# Patient Record
Sex: MALE | Race: WHITE | NOT HISPANIC OR LATINO | Employment: FULL TIME | URBAN - METROPOLITAN AREA
[De-identification: names, ages, dates, MRNs, and addresses within clinical notes are randomized per-mention and may not be internally consistent; named-entity substitution may affect disease eponyms.]

---

## 2019-03-06 ENCOUNTER — OFFICE VISIT (OUTPATIENT)
Dept: FAMILY MEDICINE | Facility: CLINIC | Age: 39
End: 2019-03-06
Payer: COMMERCIAL

## 2019-03-06 VITALS
TEMPERATURE: 99 F | BODY MASS INDEX: 25.1 KG/M2 | HEIGHT: 67 IN | OXYGEN SATURATION: 98 % | SYSTOLIC BLOOD PRESSURE: 112 MMHG | WEIGHT: 159.94 LBS | HEART RATE: 70 BPM | DIASTOLIC BLOOD PRESSURE: 74 MMHG

## 2019-03-06 DIAGNOSIS — R68.89 FLU-LIKE SYMPTOMS: Primary | ICD-10-CM

## 2019-03-06 PROCEDURE — 99213 PR OFFICE/OUTPT VISIT, EST, LEVL III, 20-29 MIN: ICD-10-PCS | Mod: S$GLB,,, | Performed by: NURSE PRACTITIONER

## 2019-03-06 PROCEDURE — 99213 OFFICE O/P EST LOW 20 MIN: CPT | Mod: S$GLB,,, | Performed by: NURSE PRACTITIONER

## 2019-03-06 PROCEDURE — 99999 PR PBB SHADOW E&M-EST. PATIENT-LVL III: CPT | Mod: PBBFAC,,, | Performed by: NURSE PRACTITIONER

## 2019-03-06 PROCEDURE — 3008F PR BODY MASS INDEX (BMI) DOCUMENTED: ICD-10-PCS | Mod: CPTII,S$GLB,, | Performed by: NURSE PRACTITIONER

## 2019-03-06 PROCEDURE — 3008F BODY MASS INDEX DOCD: CPT | Mod: CPTII,S$GLB,, | Performed by: NURSE PRACTITIONER

## 2019-03-06 PROCEDURE — 99999 PR PBB SHADOW E&M-EST. PATIENT-LVL III: ICD-10-PCS | Mod: PBBFAC,,, | Performed by: NURSE PRACTITIONER

## 2019-03-06 RX ORDER — FLUTICASONE PROPIONATE 50 MCG
2 SPRAY, SUSPENSION (ML) NASAL DAILY
Qty: 16 G | Refills: 0 | Status: SHIPPED | OUTPATIENT
Start: 2019-03-06 | End: 2019-10-29

## 2019-03-06 RX ORDER — LEVOCETIRIZINE DIHYDROCHLORIDE 5 MG/1
5 TABLET, FILM COATED ORAL NIGHTLY
Qty: 90 TABLET | Refills: 3 | Status: SHIPPED | OUTPATIENT
Start: 2019-03-06 | End: 2019-10-29

## 2019-03-06 RX ORDER — BENZONATATE 200 MG/1
200 CAPSULE ORAL 3 TIMES DAILY PRN
Qty: 30 CAPSULE | Refills: 0 | Status: SHIPPED | OUTPATIENT
Start: 2019-03-06 | End: 2019-03-16

## 2019-03-06 RX ORDER — IBUPROFEN 200 MG
200 TABLET ORAL EVERY 6 HOURS PRN
COMMUNITY
End: 2019-10-29

## 2019-03-06 RX ORDER — PROMETHAZINE HYDROCHLORIDE AND DEXTROMETHORPHAN HYDROBROMIDE 6.25; 15 MG/5ML; MG/5ML
5 SYRUP ORAL NIGHTLY PRN
Qty: 180 ML | Refills: 0 | Status: SHIPPED | OUTPATIENT
Start: 2019-03-06 | End: 2019-03-16

## 2019-03-06 NOTE — PATIENT INSTRUCTIONS
"1. Xyzal one tab at night  2. Tessalon as needed 3x a day  3. Flonase post sinus rinse  4. Sinus rinse  5. Promethazine-DM 5ml as needed at nighttime.   HOW TO USE NEILMED SINUS RINSE TO IRRIGATE/CLEAN YOUR SINUSES      Obtain a Neilmed Sinus Rinse kit. You can get the kit from your local pharmacy or [employer] website. [employer] offers three types of kits:   The Sinus Rinse Starter Kit includes an 8-ounce (240ml) squeeze bottle and 5 packets of premixed rinse solution.   The Sinus Rinse Complete Kit includes an 8-ounce (240 ml) squeeze bottle and 50 packets of premixed rinse solution.   The Sinus Rinse Kids Starter Kit includes a 4-ounce (120ml) squeeze bottle and 30 packets of premixed rinse solution, specially formulated for children.  Wash your hands to avoid contaminating the product. The CDC recommends that you use warm water and soap. Scrub your hands for about 20 seconds, or about the amount of time it takes to sing the "Happy Birthday" song twice.  Warm up distilled or previously boiled water until it is slightly warm. You can warm water up on the stove or in the microwave in a clean safe container. You should warm the water for 5 seconds at a time if using a microwave. It should be at body-temperature, or "lukewarm."   Do not use water that is not micro-filtered, boiled, or distilled to rinse your sinuses. Tap water may contain microorganisms that could cause illness. Fill the bottle with the designated amount of water. The correct amount of water should be 8 oz. (240 ml). Your water line should be at the dotted fill line of the bottle. If you are using a Kids Sinus Rinse kit, you will use 4 oz. (120 ml) of water  Pour the contents into the bottle and tighten the cap. Make sure you screw the cap on tightly so it doesn't fall off in the next step  Place one finger over the tip and shake the bottle gently. This will allow the saline mixture to dissolve into the waterPut the nozzle tip snugly against one of your " nostrils. Keep your mouth open, because the mixture can drain from your mouth as well as the opposite nostril. This also reduces pressure on the earsSqueeze the bottle gently to force the liquid into your nasal passages. Squeeze until the solution begins to drain from the opposite nostrilSqueeze the bottle until 1/4-to-1/2 (60-to-120 ml) is used in one nostril. You can use up to half the solution per nostril, but you should always use at least one-quarter of the solution for each. Blow your nose without pinching it completely shut. Pinching your nose entirely shut would put too much pressure on your eardrums. Then, try sniffing in the remaining solution to help clear out the nasopharyngeal area (at the back of your nasal passages).   Tilt your head to the opposite side to expel any remaining solution from your sinuses or nasal passage.   Spit out any solution that reaches the back of your throat.  Repeat the last 5 steps for the other nostril  Discard the tiny amount of solution left over. Never store leftover solution. It can breed bacteria  Disinfect the sinus rinse bottle. Rinse the cap, tube, and rinse bottle with water. Then, add a drop of dishwashing detergent to the bottle and fill it with water. Put the cap on and shake the bottle well. Squeeze the soapy water through the cap. Use a bottle brush to scrub the bottle, cap, and tube. Rinse thoroughly with clean water. Air dry the bottle and nozzle on a clean towel or glass plate

## 2019-03-06 NOTE — PROGRESS NOTES
Subjective:        Chief Complaint  Chief Complaint   Patient presents with    Cough    bodyache    Fever       HPI  Star Saeed is a 38 y.o. male with multiple medical diagnoses as listed in the medical history and problem list that presents for flu like symptoms for 3 days.  Patient is new to me. 3 days of fever, chills, cough, sore throat, ear pain, post nasal drip. Denies productive cough, nausea or vomiting. Taking ibuprofen, OTC decongestant, cough syrup with some relief.         PAST MEDICAL HISTORY:  History reviewed. No pertinent past medical history.    PAST SURGICAL HISTORY:  Past Surgical History:   Procedure Laterality Date    ADENOIDECTOMY      TONSILLECTOMY         SOCIAL HISTORY:  Social History     Socioeconomic History    Marital status:      Spouse name: Not on file    Number of children: Not on file    Years of education: Not on file    Highest education level: Not on file   Social Needs    Financial resource strain: Not on file    Food insecurity - worry: Not on file    Food insecurity - inability: Not on file    Transportation needs - medical: Not on file    Transportation needs - non-medical: Not on file   Occupational History     Employer: [employer].    Tobacco Use    Smoking status: Never Smoker   Substance and Sexual Activity    Alcohol use: Yes    Drug use: Not on file    Sexual activity: Not on file   Other Topics Concern    Not on file   Social History Narrative    Not on file       FAMILY HISTORY:  Family History   Problem Relation Age of Onset    Cancer Mother         breast       ALLERGIES AND MEDICATIONS: updated and reviewed.  Review of patient's allergies indicates:  No Known Allergies  Current Outpatient Medications   Medication Sig Dispense Refill    ibuprofen (ADVIL,MOTRIN) 200 MG tablet Take 200 mg by mouth every 6 (six) hours as needed for Pain.      benzonatate (TESSALON) 200 MG capsule Take 1 capsule (200 mg total) by mouth 3 (three)  "times daily as needed for Cough. 30 capsule 0    fluticasone (FLONASE) 50 mcg/actuation nasal spray 2 sprays (100 mcg total) by Each Nare route once daily. 16 g 0    levocetirizine (XYZAL) 5 MG tablet Take 1 tablet (5 mg total) by mouth every evening. 90 tablet 3    promethazine-dextromethorphan (PROMETHAZINE-DM) 6.25-15 mg/5 mL Syrp Take 5 mLs by mouth nightly as needed. 180 mL 0     No current facility-administered medications for this visit.        ROS  Review of Systems   Constitutional: Positive for chills and fever. Negative for activity change.   HENT: Positive for congestion, ear pain (pressure), postnasal drip, rhinorrhea, sinus pressure, sinus pain, sneezing and sore throat. Negative for ear discharge.    Eyes: Negative for pain, discharge and itching.   Respiratory: Positive for cough (nonproductive). Negative for shortness of breath and wheezing.    Cardiovascular: Negative for chest pain.   Gastrointestinal: Negative for diarrhea, nausea and vomiting.   Musculoskeletal: Positive for myalgias.   Neurological: Negative for headaches.   Psychiatric/Behavioral: Negative for confusion and decreased concentration.       Objective:     Physical Exam  Vitals:    03/06/19 1136   BP: 112/74   Pulse: 70   Temp: 98.5 °F (36.9 °C)   TempSrc: Oral   SpO2: 98%   Weight: 72.6 kg (159 lb 15.1 oz)   Height: 5' 7" (1.702 m)    Body mass index is 25.05 kg/m².  Weight: 72.6 kg (159 lb 15.1 oz)   Height: 5' 7" (170.2 cm)   Physical Exam   Constitutional: He appears well-developed and well-nourished.   HENT:   Head: Normocephalic and atraumatic.   Right Ear: Ear canal normal.   Left Ear: Ear canal normal.   Nose: Mucosal edema and rhinorrhea present. No nasal deformity. No epistaxis. Right sinus exhibits maxillary sinus tenderness. Right sinus exhibits no frontal sinus tenderness. Left sinus exhibits maxillary sinus tenderness. Left sinus exhibits no frontal sinus tenderness.   Mouth/Throat: Uvula is midline and mucous " membranes are normal. Posterior oropharyngeal erythema present. No oropharyngeal exudate or posterior oropharyngeal edema.   Ample cerumen bilaterally   Eyes: Conjunctivae and EOM are normal.   Neck: Normal range of motion. Neck supple.   Cardiovascular: Normal rate.   Pulmonary/Chest: Effort normal and breath sounds normal. No stridor. He has no wheezes. He has no rales.   Musculoskeletal: He exhibits no edema.   Lymphadenopathy:     He has no cervical adenopathy.   Neurological: He is alert. No cranial nerve deficit.   Skin: Skin is warm and dry. No rash noted.   Vitals reviewed.      Assessment:     1. Flu-like symptoms      Plan:     Star was seen today for cough, bodyache and fever.    Diagnoses and all orders for this visit:    Flu-like symptoms  -     POCT Influenza A/B Molecular - NEGATIVE  -     promethazine-dextromethorphan (PROMETHAZINE-DM) 6.25-15 mg/5 mL Syrp; Take 5 mLs by mouth nightly as needed.  -     fluticasone (FLONASE) 50 mcg/actuation nasal spray; 2 sprays (100 mcg total) by Each Nare route once daily.  -     benzonatate (TESSALON) 200 MG capsule; Take 1 capsule (200 mg total) by mouth 3 (three) times daily as needed for Cough.        -     levocetirizine (XYZAL) 5 MG tablet; Take 1 tablet (5 mg total) by mouth every evening.        -     Sinus rinse as needed        -     Increase fluid intake      Health Maintenance       Date Due Completion Date    Lipid Panel 1979 ---    TETANUS VACCINE 03/31/1997 ---    Influenza Vaccine 08/01/2018 10/21/2016          Health Maintenance reviewed, addressed as per orders    Follow-up: Follow-up if symptoms worsen or fail to improve.    The patient expressed understanding and no barriers to adherence were identified.     1. The patient indicates understanding of these issues and agrees with the plan. Brief care plan is updated and reviewed with the patient as applicable.     2. The patient is given an After Visit Summary that lists all medications  with directions, allergies, orders placed during this encounter and follow-up instructions.     3. I have reviewed the patient's medical information including past medical, family, and social history sections including the medications and allergies.     4. We discussed the patient's current medications. I reconciled the patient's medication list and prepared and supplied needed refills.       Mayelin Carlos, DNP, APRN, FNP-c  Family Medicine    My collaborating physicians are Dr. Daryn Paul and Dr. Kayla Mendoza

## 2019-03-07 ENCOUNTER — PATIENT MESSAGE (OUTPATIENT)
Dept: FAMILY MEDICINE | Facility: CLINIC | Age: 39
End: 2019-03-07

## 2019-10-29 ENCOUNTER — OFFICE VISIT (OUTPATIENT)
Dept: FAMILY MEDICINE | Facility: CLINIC | Age: 39
End: 2019-10-29
Payer: COMMERCIAL

## 2019-10-29 VITALS
WEIGHT: 165.88 LBS | HEIGHT: 67 IN | HEART RATE: 78 BPM | TEMPERATURE: 98 F | BODY MASS INDEX: 26.03 KG/M2 | RESPIRATION RATE: 16 BRPM | SYSTOLIC BLOOD PRESSURE: 118 MMHG | OXYGEN SATURATION: 98 % | DIASTOLIC BLOOD PRESSURE: 72 MMHG

## 2019-10-29 DIAGNOSIS — Z23 NEED FOR INFLUENZA VACCINATION: ICD-10-CM

## 2019-10-29 DIAGNOSIS — Z00.00 ROUTINE PHYSICAL EXAMINATION: Primary | ICD-10-CM

## 2019-10-29 PROCEDURE — 90686 FLU VACCINE (QUAD) GREATER THAN OR EQUAL TO 3YO PRESERVATIVE FREE IM: ICD-10-PCS | Mod: S$GLB,,, | Performed by: NURSE PRACTITIONER

## 2019-10-29 PROCEDURE — 99999 PR PBB SHADOW E&M-EST. PATIENT-LVL IV: CPT | Mod: PBBFAC,,, | Performed by: NURSE PRACTITIONER

## 2019-10-29 PROCEDURE — 99396 PR PREVENTIVE VISIT,EST,40-64: ICD-10-PCS | Mod: 25,S$GLB,, | Performed by: NURSE PRACTITIONER

## 2019-10-29 PROCEDURE — 90686 IIV4 VACC NO PRSV 0.5 ML IM: CPT | Mod: S$GLB,,, | Performed by: NURSE PRACTITIONER

## 2019-10-29 PROCEDURE — 90471 FLU VACCINE (QUAD) GREATER THAN OR EQUAL TO 3YO PRESERVATIVE FREE IM: ICD-10-PCS | Mod: S$GLB,,, | Performed by: NURSE PRACTITIONER

## 2019-10-29 PROCEDURE — 99999 PR PBB SHADOW E&M-EST. PATIENT-LVL IV: ICD-10-PCS | Mod: PBBFAC,,, | Performed by: NURSE PRACTITIONER

## 2019-10-29 PROCEDURE — 99396 PREV VISIT EST AGE 40-64: CPT | Mod: 25,S$GLB,, | Performed by: NURSE PRACTITIONER

## 2019-10-29 PROCEDURE — 90471 IMMUNIZATION ADMIN: CPT | Mod: S$GLB,,, | Performed by: NURSE PRACTITIONER

## 2019-10-29 NOTE — PATIENT INSTRUCTIONS
Prevention Guidelines, Men Ages 40 to 49  Screening tests and vaccines are an important part of managing your health. Health counseling is essential, too. Below are guidelines for these, for men ages 40 to 49. Talk with your healthcare provider to make sure youre up to date on what you need.  Screening Who needs it How often   Alcohol misuse All men in this age group At routine exams   Blood pressure All men in this age group Every 2 years if your blood pressure reading is less than 120/80 mm Hg; yearly if your systolic blood pressure reading is 120 to 139 mm Hg, or your diastolic blood pressure reading is 80 to 89 mm Hg   Depression All men in this age group At routine exams   Type 2 diabetes or prediabetes All adults beginning at age 45 and adults without symptoms at any age who are overweight or obese and have 1 or more other risk factors for diabetes At least every 3 years (yearly if blood sugar has begun to rise)   Hepatitis C Men at increased risk for infection - talk with your healthcare provider At routine exams   High cholesterol or triglycerides All men ages 35 and older, and younger men at high risk for coronary artery disease At least every 5 years   HIV All men At routine exams   Obesity All men in this age group At routine exams   Prostate cancer Starting at age 45, talk to healthcare provider about risks and benefits of digital rectal exam (MARY) and prostate-specific antigen (PSA) screening1 At routine exams   Syphilis Men at increased risk for infection - talk with your healthcare provider At routine exams   Tuberculosis Men at increased risk for infection - talk with your healthcare provider Check with your healthcare provider   Vision All men in this age group Every 2 to 4 years if no risk factors for eye disease2   Vaccine Who needs it How often   Chickenpox (varicella) All men in this age group who have no record of this infection or vaccine 2 doses; the second dose should be given at least 4  weeks after the first dose   Hepatitis A Men at increased risk for infection - talk with your healthcare provider 2 doses given at least 6 months apart   Hepatitis B Men at increased risk for infection - talk with your healthcare provider 3 doses over 6 months; second dose should be given 1 month after the first dose; the third dose should be given at least 2 months after the second dose and at least 4 months after the first dose   Haemophilus influenzae Type B (HIB) Men at increased risk for infection - talk with your healthcare provider 1 to 3 doses   Influenza (flu) All men in this age group Once a year   Measles, mumps, rubella (MMR) All men in this age group who have no record of these infections or vaccines 1 or 2 doses   Meningococcal Men at increased risk for infection - talk with your healthcare provider 1 or more doses   Pneumococcal conjugate vaccine (PCV13) and pneumococcal polysaccharide vaccine (PPSV23) Men at increased risk for infection - talk with your healthcare provider PCV13: 1 dose ages 19 to 65 (protects against 13 types of pneumococcal bacteria)     PPSV23: 1 to 2 doses through age 64, or 1 dose at 65 or older (protects against 23 types of pneumococcal bacteria)      Tetanus/diphtheria/  pertussis (Td/Tdap) booster All men in this age group Td every 10 years, or a one-time dose of Tdap instead of a Td booster after age 18, then Td every 10 years   Counseling Who needs it How often   Diet and exercise Men who are overweight or obese When diagnosed, and then at routine exams   Sexually transmitted infection prevention Men at increased risk for infection - talk with your healthcare provider At routine exams   Use of daily aspirin Men ages 45 to 79 at risk for cardiovascular health problems At routine exams   Use of tobacco and the health effects it can cause All men in this age group Every exam   [address] [place] [address] [facility]   [facility] [employer]  Date Last Reviewed:  2/1/2017  © 5776-3513 [employer]. [address] [place] [address], [place] [address]. All rights reserved. This information is not intended as a substitute for professional medical care. Always follow your healthcare professional's instructions.

## 2019-10-29 NOTE — PROGRESS NOTES
Subjective:       Chief Complaint  Chief Complaint   Patient presents with    Annual Exam       HPI  Star Saeed is a 39 y.o. male with multiple medical diagnoses as listed in the medical history and problem list that presents for annual exam. He reports that he is feeling healthy and well today.     Diet: homemade. Breakfast- oatmeal with nuts and dried fruit. Will eat lunch and dinner.   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  Bowel/Bladder: denies    Patient Care Team:  Ander Melton MD as PCP - General (Internal Medicine)      PAST MEDICAL HISTORY:  History reviewed. No pertinent past medical history.    PAST SURGICAL HISTORY:  Past Surgical History:   Procedure Laterality Date    ADENOIDECTOMY      TONSILLECTOMY         SOCIAL HISTORY:  Social History     Socioeconomic History    Marital status:      Spouse name: Not on file    Number of children: Not on file    Years of education: Not on file    Highest education level: Not on file   Occupational History     Employer: [employer].    Social Needs    Financial resource strain: Not on file    Food insecurity:     Worry: Not on file     Inability: Not on file    Transportation needs:     Medical: Not on file     Non-medical: Not on file   Tobacco Use    Smoking status: Never Smoker   Substance and Sexual Activity    Alcohol use: Yes    Drug use: Not on file    Sexual activity: Not on file   Lifestyle    Physical activity:     Days per week: Not on file     Minutes per session: Not on file    Stress: Not on file   Relationships    Social connections:     Talks on phone: Not on file     Gets together: Not on file     Attends  "Mormonism service: Not on file     Active member of club or organization: Not on file     Attends meetings of clubs or organizations: Not on file     Relationship status: Not on file   Other Topics Concern    Not on file   Social History Narrative    Not on file       FAMILY HISTORY:  Family History   Problem Relation Age of Onset    Cancer Mother         breast       ALLERGIES AND MEDICATIONS: updated and reviewed.  Review of patient's allergies indicates:  No Known Allergies  No current outpatient medications on file.     No current facility-administered medications for this visit.          ROS  Review of Systems   Constitutional: Negative for chills, diaphoresis and fever.   HENT: Negative for congestion and rhinorrhea.    Respiratory: Negative for cough and shortness of breath.    Cardiovascular: Negative for chest pain.   Gastrointestinal: Negative for abdominal pain, constipation, diarrhea and nausea.   Genitourinary: Negative for difficulty urinating, dysuria and enuresis.   Musculoskeletal: Negative for arthralgias and joint swelling.   Skin: Negative for rash and wound.   Neurological: Negative for dizziness and headaches.   Psychiatric/Behavioral: Negative for dysphoric mood. The patient is not nervous/anxious.          Objective:       Physical Exam  Vitals:    10/29/19 1239   BP: 118/72   BP Location: Left arm   Patient Position: Sitting   BP Method: Small (Manual)   Pulse: 78   Resp: 16   Temp: 97.9 °F (36.6 °C)   TempSrc: Oral   SpO2: 98%   Weight: 75.3 kg (165 lb 14.3 oz)   Height: 5' 7" (1.702 m)    Body mass index is 25.98 kg/m².  Weight: 75.3 kg (165 lb 14.3 oz)   Height: 5' 7" (170.2 cm)   Physical Exam   Constitutional: He appears well-developed and well-nourished.   HENT:   Head: Normocephalic and atraumatic.   Eyes: Conjunctivae and EOM are normal.   Neck: Normal range of motion. Neck supple.   Cardiovascular: Normal rate.   Pulmonary/Chest: Effort normal.   Musculoskeletal: He exhibits no " edema.   Lymphadenopathy:     He has no cervical adenopathy.   Neurological: He is alert. No cranial nerve deficit.   Skin: Skin is warm and dry. No rash noted.   Vitals reviewed.    Assessment:     1. Routine physical examination    2. Need for influenza vaccination      Plan:     Star was seen today for annual exam.    Diagnoses and all orders for this visit:    Routine physical examination        -     Discussed healthy diet, regular exercise, necessary labs, age appropriate cancer screening, and routine vaccinations.          -     Declined all labs    Need for influenza vaccination  -     Influenza - Quadrivalent (3 years & older) (PF)  -     Counseled regarding seasonal influenza vaccination - administered      Health Maintenance       Date Due Completion Date    Lipid Panel 1979 ---    TETANUS VACCINE 03/31/1997 ---    Influenza Vaccine (1) 09/01/2019 10/21/2016          Health Maintenance reviewed, addressed as per orders    Follow up in about 1 year (around 10/29/2020) for Physical.    The patient expressed understanding and no barriers to adherence were identified.     1. The patient indicates understanding of these issues and agrees with the plan. Brief care plan is updated and reviewed with the patient as applicable.     2. The patient is given an After Visit Summary that lists all medications with directions, allergies, orders placed during this encounter and follow-up instructions.     3. I have reviewed the patient's medical information including past medical, family, and social history sections including the medications and allergies.     4. We discussed the patient's current medications. I reconciled the patient's medication list and prepared and supplied needed refills.       Mayelin Carlos, DNP, APRN, FNP-c  Family Medicine    My collaborating physicians are Dr. Daryn Paul and Dr. Ander Melton

## 2021-07-16 ENCOUNTER — TELEPHONE (OUTPATIENT)
Dept: DERMATOLOGY | Facility: CLINIC | Age: 41
End: 2021-07-16

## 2021-08-11 ENCOUNTER — TELEPHONE (OUTPATIENT)
Dept: FAMILY MEDICINE | Facility: CLINIC | Age: 41
End: 2021-08-11

## 2021-09-21 ENCOUNTER — OFFICE VISIT (OUTPATIENT)
Dept: FAMILY MEDICINE | Facility: CLINIC | Age: 41
End: 2021-09-21
Payer: COMMERCIAL

## 2021-09-21 VITALS
HEART RATE: 80 BPM | TEMPERATURE: 99 F | BODY MASS INDEX: 25.96 KG/M2 | DIASTOLIC BLOOD PRESSURE: 84 MMHG | WEIGHT: 165.38 LBS | OXYGEN SATURATION: 97 % | HEIGHT: 67 IN | SYSTOLIC BLOOD PRESSURE: 128 MMHG

## 2021-09-21 DIAGNOSIS — Z11.4 ENCOUNTER FOR SCREENING FOR HUMAN IMMUNODEFICIENCY VIRUS (HIV): ICD-10-CM

## 2021-09-21 DIAGNOSIS — Z86.39 HISTORY OF THYROID DISORDER: ICD-10-CM

## 2021-09-21 DIAGNOSIS — Z00.00 ROUTINE ADULT HEALTH MAINTENANCE: Primary | ICD-10-CM

## 2021-09-21 DIAGNOSIS — H43.393 FLOATERS IN VISUAL FIELD, BILATERAL: ICD-10-CM

## 2021-09-21 DIAGNOSIS — L91.8 SKIN TAG: ICD-10-CM

## 2021-09-21 DIAGNOSIS — Z97.3 WEARS GLASSES: ICD-10-CM

## 2021-09-21 DIAGNOSIS — Z13.220 SCREENING FOR LIPID DISORDERS: ICD-10-CM

## 2021-09-21 DIAGNOSIS — Z11.59 NEED FOR HEPATITIS C SCREENING TEST: ICD-10-CM

## 2021-09-21 PROCEDURE — 1160F PR REVIEW ALL MEDS BY PRESCRIBER/CLIN PHARMACIST DOCUMENTED: ICD-10-PCS | Mod: CPTII,S$GLB,, | Performed by: INTERNAL MEDICINE

## 2021-09-21 PROCEDURE — 3074F SYST BP LT 130 MM HG: CPT | Mod: CPTII,S$GLB,, | Performed by: INTERNAL MEDICINE

## 2021-09-21 PROCEDURE — 3079F DIAST BP 80-89 MM HG: CPT | Mod: CPTII,S$GLB,, | Performed by: INTERNAL MEDICINE

## 2021-09-21 PROCEDURE — 3008F BODY MASS INDEX DOCD: CPT | Mod: CPTII,S$GLB,, | Performed by: INTERNAL MEDICINE

## 2021-09-21 PROCEDURE — 3079F PR MOST RECENT DIASTOLIC BLOOD PRESSURE 80-89 MM HG: ICD-10-PCS | Mod: CPTII,S$GLB,, | Performed by: INTERNAL MEDICINE

## 2021-09-21 PROCEDURE — 3008F PR BODY MASS INDEX (BMI) DOCUMENTED: ICD-10-PCS | Mod: CPTII,S$GLB,, | Performed by: INTERNAL MEDICINE

## 2021-09-21 PROCEDURE — 1159F MED LIST DOCD IN RCRD: CPT | Mod: CPTII,S$GLB,, | Performed by: INTERNAL MEDICINE

## 2021-09-21 PROCEDURE — 1160F RVW MEDS BY RX/DR IN RCRD: CPT | Mod: CPTII,S$GLB,, | Performed by: INTERNAL MEDICINE

## 2021-09-21 PROCEDURE — 3074F PR MOST RECENT SYSTOLIC BLOOD PRESSURE < 130 MM HG: ICD-10-PCS | Mod: CPTII,S$GLB,, | Performed by: INTERNAL MEDICINE

## 2021-09-21 PROCEDURE — 99396 PREV VISIT EST AGE 40-64: CPT | Mod: S$GLB,,, | Performed by: INTERNAL MEDICINE

## 2021-09-21 PROCEDURE — 99396 PR PREVENTIVE VISIT,EST,40-64: ICD-10-PCS | Mod: S$GLB,,, | Performed by: INTERNAL MEDICINE

## 2021-09-21 PROCEDURE — 99999 PR PBB SHADOW E&M-EST. PATIENT-LVL IV: ICD-10-PCS | Mod: PBBFAC,,, | Performed by: INTERNAL MEDICINE

## 2021-09-21 PROCEDURE — 99999 PR PBB SHADOW E&M-EST. PATIENT-LVL IV: CPT | Mod: PBBFAC,,, | Performed by: INTERNAL MEDICINE

## 2021-09-21 PROCEDURE — 1159F PR MEDICATION LIST DOCUMENTED IN MEDICAL RECORD: ICD-10-PCS | Mod: CPTII,S$GLB,, | Performed by: INTERNAL MEDICINE

## 2021-09-25 ENCOUNTER — LAB VISIT (OUTPATIENT)
Dept: LAB | Facility: HOSPITAL | Age: 41
End: 2021-09-25
Attending: INTERNAL MEDICINE
Payer: COMMERCIAL

## 2021-09-25 DIAGNOSIS — Z11.59 NEED FOR HEPATITIS C SCREENING TEST: ICD-10-CM

## 2021-09-25 DIAGNOSIS — Z13.220 SCREENING FOR LIPID DISORDERS: ICD-10-CM

## 2021-09-25 DIAGNOSIS — Z00.00 ROUTINE ADULT HEALTH MAINTENANCE: ICD-10-CM

## 2021-09-25 DIAGNOSIS — Z11.4 ENCOUNTER FOR SCREENING FOR HUMAN IMMUNODEFICIENCY VIRUS (HIV): ICD-10-CM

## 2021-09-25 DIAGNOSIS — Z86.39 HISTORY OF THYROID DISORDER: ICD-10-CM

## 2021-09-25 LAB
ALBUMIN SERPL BCP-MCNC: 4.3 G/DL (ref 3.5–5.2)
ALP SERPL-CCNC: 41 U/L (ref 55–135)
ALT SERPL W/O P-5'-P-CCNC: 20 U/L (ref 10–44)
ANION GAP SERPL CALC-SCNC: 10 MMOL/L (ref 8–16)
AST SERPL-CCNC: 15 U/L (ref 10–40)
BILIRUB SERPL-MCNC: 0.8 MG/DL (ref 0.1–1)
BUN SERPL-MCNC: 10 MG/DL (ref 6–20)
CALCIUM SERPL-MCNC: 9.5 MG/DL (ref 8.7–10.5)
CHLORIDE SERPL-SCNC: 105 MMOL/L (ref 95–110)
CHOLEST SERPL-MCNC: 187 MG/DL (ref 120–199)
CHOLEST/HDLC SERPL: 3.2 {RATIO} (ref 2–5)
CO2 SERPL-SCNC: 23 MMOL/L (ref 23–29)
CREAT SERPL-MCNC: 0.9 MG/DL (ref 0.5–1.4)
EST. GFR  (AFRICAN AMERICAN): >60 ML/MIN/1.73 M^2
EST. GFR  (NON AFRICAN AMERICAN): >60 ML/MIN/1.73 M^2
GLUCOSE SERPL-MCNC: 91 MG/DL (ref 70–110)
HDLC SERPL-MCNC: 59 MG/DL (ref 40–75)
HDLC SERPL: 31.6 % (ref 20–50)
LDLC SERPL CALC-MCNC: 113.2 MG/DL (ref 63–159)
NONHDLC SERPL-MCNC: 128 MG/DL
POTASSIUM SERPL-SCNC: 4.7 MMOL/L (ref 3.5–5.1)
PROT SERPL-MCNC: 7.4 G/DL (ref 6–8.4)
SODIUM SERPL-SCNC: 138 MMOL/L (ref 136–145)
T4 FREE SERPL-MCNC: 0.98 NG/DL (ref 0.71–1.51)
TRIGL SERPL-MCNC: 74 MG/DL (ref 30–150)
TSH SERPL DL<=0.005 MIU/L-ACNC: 5.49 UIU/ML (ref 0.4–4)

## 2021-09-25 PROCEDURE — 84443 ASSAY THYROID STIM HORMONE: CPT | Performed by: INTERNAL MEDICINE

## 2021-09-25 PROCEDURE — 80061 LIPID PANEL: CPT | Performed by: INTERNAL MEDICINE

## 2021-09-25 PROCEDURE — 84439 ASSAY OF FREE THYROXINE: CPT | Performed by: INTERNAL MEDICINE

## 2021-09-25 PROCEDURE — 86803 HEPATITIS C AB TEST: CPT | Performed by: INTERNAL MEDICINE

## 2021-09-25 PROCEDURE — 36415 COLL VENOUS BLD VENIPUNCTURE: CPT | Mod: PO | Performed by: INTERNAL MEDICINE

## 2021-09-25 PROCEDURE — 80053 COMPREHEN METABOLIC PANEL: CPT | Performed by: INTERNAL MEDICINE

## 2021-09-25 PROCEDURE — 87389 HIV-1 AG W/HIV-1&-2 AB AG IA: CPT | Performed by: INTERNAL MEDICINE

## 2021-09-27 LAB
HCV AB SERPL QL IA: NEGATIVE
HIV 1+2 AB+HIV1 P24 AG SERPL QL IA: NEGATIVE

## 2021-10-05 ENCOUNTER — TELEPHONE (OUTPATIENT)
Dept: FAMILY MEDICINE | Facility: CLINIC | Age: 41
End: 2021-10-05

## 2021-10-05 PROBLEM — R79.89 ABNORMAL TSH: Status: ACTIVE | Noted: 2021-10-05

## 2021-10-07 ENCOUNTER — OFFICE VISIT (OUTPATIENT)
Dept: URGENT CARE | Facility: CLINIC | Age: 41
End: 2021-10-07
Payer: COMMERCIAL

## 2021-10-07 VITALS
OXYGEN SATURATION: 99 % | SYSTOLIC BLOOD PRESSURE: 128 MMHG | TEMPERATURE: 98 F | HEART RATE: 68 BPM | RESPIRATION RATE: 14 BRPM | DIASTOLIC BLOOD PRESSURE: 78 MMHG

## 2021-10-07 DIAGNOSIS — S43.51XA SPRAIN OF ACROMIOCLAVICULAR JOINT, RIGHT, INITIAL ENCOUNTER: Primary | ICD-10-CM

## 2021-10-07 PROCEDURE — 1160F RVW MEDS BY RX/DR IN RCRD: CPT | Mod: CPTII,S$GLB,, | Performed by: PHYSICIAN ASSISTANT

## 2021-10-07 PROCEDURE — 99203 PR OFFICE/OUTPT VISIT, NEW, LEVL III, 30-44 MIN: ICD-10-PCS | Mod: S$GLB,,, | Performed by: PHYSICIAN ASSISTANT

## 2021-10-07 PROCEDURE — 3074F SYST BP LT 130 MM HG: CPT | Mod: CPTII,S$GLB,, | Performed by: PHYSICIAN ASSISTANT

## 2021-10-07 PROCEDURE — 73030 XR SHOULDER COMPLETE 2 OR MORE VIEWS RIGHT: ICD-10-PCS | Mod: RT,S$GLB,, | Performed by: RADIOLOGY

## 2021-10-07 PROCEDURE — 3078F PR MOST RECENT DIASTOLIC BLOOD PRESSURE < 80 MM HG: ICD-10-PCS | Mod: CPTII,S$GLB,, | Performed by: PHYSICIAN ASSISTANT

## 2021-10-07 PROCEDURE — 99203 OFFICE O/P NEW LOW 30 MIN: CPT | Mod: S$GLB,,, | Performed by: PHYSICIAN ASSISTANT

## 2021-10-07 PROCEDURE — 1159F MED LIST DOCD IN RCRD: CPT | Mod: CPTII,S$GLB,, | Performed by: PHYSICIAN ASSISTANT

## 2021-10-07 PROCEDURE — 73030 X-RAY EXAM OF SHOULDER: CPT | Mod: RT,S$GLB,, | Performed by: RADIOLOGY

## 2021-10-07 PROCEDURE — 1159F PR MEDICATION LIST DOCUMENTED IN MEDICAL RECORD: ICD-10-PCS | Mod: CPTII,S$GLB,, | Performed by: PHYSICIAN ASSISTANT

## 2021-10-07 PROCEDURE — 3074F PR MOST RECENT SYSTOLIC BLOOD PRESSURE < 130 MM HG: ICD-10-PCS | Mod: CPTII,S$GLB,, | Performed by: PHYSICIAN ASSISTANT

## 2021-10-07 PROCEDURE — 1160F PR REVIEW ALL MEDS BY PRESCRIBER/CLIN PHARMACIST DOCUMENTED: ICD-10-PCS | Mod: CPTII,S$GLB,, | Performed by: PHYSICIAN ASSISTANT

## 2021-10-07 PROCEDURE — 3078F DIAST BP <80 MM HG: CPT | Mod: CPTII,S$GLB,, | Performed by: PHYSICIAN ASSISTANT

## 2021-10-07 RX ORDER — METHOCARBAMOL 500 MG/1
500 TABLET, FILM COATED ORAL 3 TIMES DAILY
Qty: 10 TABLET | Refills: 0 | Status: SHIPPED | OUTPATIENT
Start: 2021-10-07 | End: 2021-10-10

## 2021-12-09 ENCOUNTER — OFFICE VISIT (OUTPATIENT)
Dept: DERMATOLOGY | Facility: CLINIC | Age: 41
End: 2021-12-09
Payer: COMMERCIAL

## 2021-12-09 DIAGNOSIS — D48.5 NEOPLASM OF UNCERTAIN BEHAVIOR OF SKIN: ICD-10-CM

## 2021-12-09 PROCEDURE — 99499 UNLISTED E&M SERVICE: CPT | Mod: S$GLB,,, | Performed by: DERMATOLOGY

## 2021-12-09 PROCEDURE — 88305 TISSUE EXAM BY PATHOLOGIST: CPT | Mod: 26,,, | Performed by: PATHOLOGY

## 2021-12-09 PROCEDURE — 99499 NO LOS: ICD-10-PCS | Mod: S$GLB,,, | Performed by: DERMATOLOGY

## 2021-12-09 PROCEDURE — 11102 PR TANGENTIAL BIOPSY, SKIN, SINGLE LESION: ICD-10-PCS | Mod: S$GLB,,, | Performed by: DERMATOLOGY

## 2021-12-09 PROCEDURE — 11102 TANGNTL BX SKIN SINGLE LES: CPT | Mod: S$GLB,,, | Performed by: DERMATOLOGY

## 2021-12-09 PROCEDURE — 88304 TISSUE EXAM BY PATHOLOGIST: CPT | Performed by: PATHOLOGY

## 2021-12-09 PROCEDURE — 88305 TISSUE EXAM BY PATHOLOGIST: ICD-10-PCS | Mod: 26,,, | Performed by: PATHOLOGY

## 2021-12-16 LAB
FINAL PATHOLOGIC DIAGNOSIS: NORMAL
GROSS: NORMAL
Lab: NORMAL
MICROSCOPIC EXAM: NORMAL

## 2022-09-02 ENCOUNTER — OFFICE VISIT (OUTPATIENT)
Dept: FAMILY MEDICINE | Facility: CLINIC | Age: 42
End: 2022-09-02
Payer: COMMERCIAL

## 2022-09-02 ENCOUNTER — LAB VISIT (OUTPATIENT)
Dept: LAB | Facility: HOSPITAL | Age: 42
End: 2022-09-02
Attending: INTERNAL MEDICINE
Payer: COMMERCIAL

## 2022-09-02 VITALS
SYSTOLIC BLOOD PRESSURE: 110 MMHG | WEIGHT: 153.69 LBS | TEMPERATURE: 98 F | OXYGEN SATURATION: 97 % | HEIGHT: 67 IN | DIASTOLIC BLOOD PRESSURE: 82 MMHG | BODY MASS INDEX: 24.12 KG/M2 | HEART RATE: 65 BPM

## 2022-09-02 DIAGNOSIS — Z00.00 ROUTINE ADULT HEALTH MAINTENANCE: ICD-10-CM

## 2022-09-02 DIAGNOSIS — Z00.00 ROUTINE ADULT HEALTH MAINTENANCE: Primary | ICD-10-CM

## 2022-09-02 DIAGNOSIS — Z97.3 WEARS GLASSES: ICD-10-CM

## 2022-09-02 DIAGNOSIS — Z86.39 HISTORY OF THYROID DISORDER: ICD-10-CM

## 2022-09-02 LAB
ALBUMIN SERPL BCP-MCNC: 4.4 G/DL (ref 3.5–5.2)
ALP SERPL-CCNC: 38 U/L (ref 55–135)
ALT SERPL W/O P-5'-P-CCNC: 17 U/L (ref 10–44)
ANION GAP SERPL CALC-SCNC: 8 MMOL/L (ref 8–16)
AST SERPL-CCNC: 17 U/L (ref 10–40)
BASOPHILS # BLD AUTO: 0.03 K/UL (ref 0–0.2)
BASOPHILS NFR BLD: 0.5 % (ref 0–1.9)
BILIRUB SERPL-MCNC: 1 MG/DL (ref 0.1–1)
BUN SERPL-MCNC: 13 MG/DL (ref 6–20)
CALCIUM SERPL-MCNC: 9.9 MG/DL (ref 8.7–10.5)
CHLORIDE SERPL-SCNC: 107 MMOL/L (ref 95–110)
CHOLEST SERPL-MCNC: 178 MG/DL (ref 120–199)
CHOLEST/HDLC SERPL: 2.8 {RATIO} (ref 2–5)
CO2 SERPL-SCNC: 24 MMOL/L (ref 23–29)
CREAT SERPL-MCNC: 0.8 MG/DL (ref 0.5–1.4)
DIFFERENTIAL METHOD: ABNORMAL
EOSINOPHIL # BLD AUTO: 0.4 K/UL (ref 0–0.5)
EOSINOPHIL NFR BLD: 6.2 % (ref 0–8)
ERYTHROCYTE [DISTWIDTH] IN BLOOD BY AUTOMATED COUNT: 13.2 % (ref 11.5–14.5)
EST. GFR  (NO RACE VARIABLE): >60 ML/MIN/1.73 M^2
GLUCOSE SERPL-MCNC: 78 MG/DL (ref 70–110)
HCT VFR BLD AUTO: 41.9 % (ref 40–54)
HDLC SERPL-MCNC: 63 MG/DL (ref 40–75)
HDLC SERPL: 35.4 % (ref 20–50)
HGB BLD-MCNC: 13.5 G/DL (ref 14–18)
IMM GRANULOCYTES # BLD AUTO: 0.02 K/UL (ref 0–0.04)
IMM GRANULOCYTES NFR BLD AUTO: 0.3 % (ref 0–0.5)
LDLC SERPL CALC-MCNC: 96.2 MG/DL (ref 63–159)
LYMPHOCYTES # BLD AUTO: 2.5 K/UL (ref 1–4.8)
LYMPHOCYTES NFR BLD: 37 % (ref 18–48)
MCH RBC QN AUTO: 29.8 PG (ref 27–31)
MCHC RBC AUTO-ENTMCNC: 32.2 G/DL (ref 32–36)
MCV RBC AUTO: 93 FL (ref 82–98)
MONOCYTES # BLD AUTO: 0.6 K/UL (ref 0.3–1)
MONOCYTES NFR BLD: 9.1 % (ref 4–15)
NEUTROPHILS # BLD AUTO: 3.1 K/UL (ref 1.8–7.7)
NEUTROPHILS NFR BLD: 46.9 % (ref 38–73)
NONHDLC SERPL-MCNC: 115 MG/DL
NRBC BLD-RTO: 0 /100 WBC
PLATELET # BLD AUTO: 198 K/UL (ref 150–450)
PMV BLD AUTO: 12 FL (ref 9.2–12.9)
POTASSIUM SERPL-SCNC: 4.8 MMOL/L (ref 3.5–5.1)
PROT SERPL-MCNC: 7.2 G/DL (ref 6–8.4)
RBC # BLD AUTO: 4.53 M/UL (ref 4.6–6.2)
SODIUM SERPL-SCNC: 139 MMOL/L (ref 136–145)
TRIGL SERPL-MCNC: 94 MG/DL (ref 30–150)
TSH SERPL DL<=0.005 MIU/L-ACNC: 3.9 UIU/ML (ref 0.4–4)
WBC # BLD AUTO: 6.62 K/UL (ref 3.9–12.7)

## 2022-09-02 PROCEDURE — 99999 PR PBB SHADOW E&M-EST. PATIENT-LVL III: ICD-10-PCS | Mod: PBBFAC,,, | Performed by: INTERNAL MEDICINE

## 2022-09-02 PROCEDURE — 99396 PREV VISIT EST AGE 40-64: CPT | Mod: S$GLB,,, | Performed by: INTERNAL MEDICINE

## 2022-09-02 PROCEDURE — 80053 COMPREHEN METABOLIC PANEL: CPT | Performed by: INTERNAL MEDICINE

## 2022-09-02 PROCEDURE — 85025 COMPLETE CBC W/AUTO DIFF WBC: CPT | Performed by: INTERNAL MEDICINE

## 2022-09-02 PROCEDURE — 1159F PR MEDICATION LIST DOCUMENTED IN MEDICAL RECORD: ICD-10-PCS | Mod: CPTII,S$GLB,, | Performed by: INTERNAL MEDICINE

## 2022-09-02 PROCEDURE — 80061 LIPID PANEL: CPT | Performed by: INTERNAL MEDICINE

## 2022-09-02 PROCEDURE — 3074F PR MOST RECENT SYSTOLIC BLOOD PRESSURE < 130 MM HG: ICD-10-PCS | Mod: CPTII,S$GLB,, | Performed by: INTERNAL MEDICINE

## 2022-09-02 PROCEDURE — 3074F SYST BP LT 130 MM HG: CPT | Mod: CPTII,S$GLB,, | Performed by: INTERNAL MEDICINE

## 2022-09-02 PROCEDURE — 99396 PR PREVENTIVE VISIT,EST,40-64: ICD-10-PCS | Mod: S$GLB,,, | Performed by: INTERNAL MEDICINE

## 2022-09-02 PROCEDURE — 99999 PR PBB SHADOW E&M-EST. PATIENT-LVL III: CPT | Mod: PBBFAC,,, | Performed by: INTERNAL MEDICINE

## 2022-09-02 PROCEDURE — 3079F PR MOST RECENT DIASTOLIC BLOOD PRESSURE 80-89 MM HG: ICD-10-PCS | Mod: CPTII,S$GLB,, | Performed by: INTERNAL MEDICINE

## 2022-09-02 PROCEDURE — 3008F PR BODY MASS INDEX (BMI) DOCUMENTED: ICD-10-PCS | Mod: CPTII,S$GLB,, | Performed by: INTERNAL MEDICINE

## 2022-09-02 PROCEDURE — 3079F DIAST BP 80-89 MM HG: CPT | Mod: CPTII,S$GLB,, | Performed by: INTERNAL MEDICINE

## 2022-09-02 PROCEDURE — 84443 ASSAY THYROID STIM HORMONE: CPT | Performed by: INTERNAL MEDICINE

## 2022-09-02 PROCEDURE — 36415 COLL VENOUS BLD VENIPUNCTURE: CPT | Mod: PO | Performed by: INTERNAL MEDICINE

## 2022-09-02 PROCEDURE — 1159F MED LIST DOCD IN RCRD: CPT | Mod: CPTII,S$GLB,, | Performed by: INTERNAL MEDICINE

## 2022-09-02 PROCEDURE — 3008F BODY MASS INDEX DOCD: CPT | Mod: CPTII,S$GLB,, | Performed by: INTERNAL MEDICINE

## 2022-09-02 NOTE — PROGRESS NOTES
Subjective:     HPI  Star Saeed is a 42 y.o. male with medical diagnoses as listed in the medical history and problem list that presents for above complaint(s).    Went fully vegetarian without alcohol for 3 months - now drinking some beer - he does drink beer currently.     Some family history of HTN, heart disease (early CAD in his grandparents)    Patient Care Team:  Ander Melton MD as PCP - General (Internal Medicine)      PAST MEDICAL HISTORY:  Past Medical History:   Diagnosis Date    Thyroid disorder        PAST SURGICAL HISTORY:  Past Surgical History:   Procedure Laterality Date    ADENOIDECTOMY      TONSILLECTOMY         SOCIAL HISTORY:  Social History     Socioeconomic History    Marital status:    Occupational History     Employer: [employer].    Tobacco Use    Smoking status: Never    Smokeless tobacco: Never   Substance and Sexual Activity    Alcohol use: Yes   Social History Narrative    Rides 75 miles per week (bikes) to work        FAMILY HISTORY:  Family History   Problem Relation Age of Onset    Cancer Mother         breast    Hypertension Father     Anxiety disorder Father     Breast cancer Maternal Grandmother     Coronary artery disease Maternal Grandfather     Hypertension Maternal Grandfather     Coronary artery disease Paternal Grandfather     ADD / ADHD Brother     Depression Brother        ALLERGIES AND MEDICATIONS: updated and reviewed.  Review of patient's allergies indicates:  No Known Allergies  No current outpatient medications on file.     No current facility-administered medications for this visit.     Review of Systems   HENT:  Negative for hearing loss.    Eyes:  Negative for discharge.   Respiratory:  Negative for wheezing.    Cardiovascular:  Negative for chest pain and palpitations.   Gastrointestinal:  Negative for blood in stool, constipation, diarrhea and vomiting.   Genitourinary:  Negative for hematuria and urgency.   Musculoskeletal:  Negative for  "neck pain.   Neurological:  Negative for weakness and headaches.   Endo/Heme/Allergies:  Negative for polydipsia.     Objective:       Physical Exam  Vitals:    09/02/22 0922   BP: 110/82   BP Location: Right arm   Patient Position: Sitting   BP Method: Medium (Manual)   Pulse: 65   Temp: 98.3 °F (36.8 °C)   TempSrc: Oral   SpO2: 97%   Weight: 69.7 kg (153 lb 10.6 oz)   Height: 5' 7" (1.702 m)    Body mass index is 24.07 kg/m².  Weight: 69.7 kg (153 lb 10.6 oz)   Height: 5' 7" (170.2 cm)   Physical Exam  Vitals reviewed.   Constitutional:       General: He is not in acute distress.     Appearance: He is well-developed.   HENT:      Head: Normocephalic and atraumatic.      Right Ear: External ear normal.      Left Ear: External ear normal.      Nose: Nose normal.   Eyes:      Pupils: Pupils are equal, round, and reactive to light.   Neck:      Thyroid: No thyromegaly.   Cardiovascular:      Rate and Rhythm: Normal rate.      Heart sounds: Normal heart sounds. No murmur heard.  Pulmonary:      Effort: Pulmonary effort is normal. No respiratory distress.      Breath sounds: Normal breath sounds. No stridor.   Abdominal:      General: Bowel sounds are normal. There is no distension.      Palpations: Abdomen is soft. There is no mass.      Tenderness: There is no abdominal tenderness. There is no guarding.      Hernia: No hernia is present.   Musculoskeletal:         General: No tenderness. Normal range of motion.      Cervical back: Normal range of motion and neck supple.   Skin:     General: Skin is warm and dry.      Findings: No erythema or rash.   Neurological:      Mental Status: He is alert.      Cranial Nerves: No cranial nerve deficit.   Psychiatric:         Behavior: Behavior normal.           Assessment:     1. Routine adult health maintenance    2. Wears glasses    3. History of thyroid disorder      Plan:     Star was seen today for annual exam.    Diagnoses and all orders for this visit:    Routine adult " health maintenance  Wears glasses  History of thyroid disorder  Labs as noted - doing well. Discussed healthy diet, regular exercise, necessary labs, age appropriate cancer screening, and routine vaccinations.    -     TSH; Future  -     Lipid Panel; Future  -     Comprehensive Metabolic Panel; Future  -     CBC Auto Differential; Future      Health Maintenance reviewed, addressed as per orders    F/u in 1 year      1. The patient indicates understanding of these issues and agrees with the plan. Brief care plan is updated and reviewed with the patient as applicable.   2. The patient is given an After Visit Summary that lists all medications with directions, allergies, orders placed during this encounter and follow-up instructions.   3. I have reviewed the patient's medical information including past medical, family, and social history sections including the medications and allergies.   4. We discussed the patient's current medications. I reconciled the patient's medication list and prepared and supplied needed refills.       Ander Melton MD  Internal Medicine-Pediatrics

## 2023-09-01 ENCOUNTER — OFFICE VISIT (OUTPATIENT)
Dept: FAMILY MEDICINE | Facility: CLINIC | Age: 43
End: 2023-09-01
Payer: COMMERCIAL

## 2023-09-01 VITALS
TEMPERATURE: 98 F | DIASTOLIC BLOOD PRESSURE: 80 MMHG | SYSTOLIC BLOOD PRESSURE: 112 MMHG | OXYGEN SATURATION: 96 % | HEART RATE: 66 BPM | WEIGHT: 171.94 LBS | BODY MASS INDEX: 26.99 KG/M2 | HEIGHT: 67 IN

## 2023-09-01 DIAGNOSIS — Z86.39 HISTORY OF THYROID DISORDER: ICD-10-CM

## 2023-09-01 DIAGNOSIS — Z00.00 ROUTINE ADULT HEALTH MAINTENANCE: Primary | ICD-10-CM

## 2023-09-01 DIAGNOSIS — Z97.3 WEARS GLASSES: ICD-10-CM

## 2023-09-01 PROCEDURE — 3079F DIAST BP 80-89 MM HG: CPT | Mod: CPTII,S$GLB,, | Performed by: INTERNAL MEDICINE

## 2023-09-01 PROCEDURE — 99396 PR PREVENTIVE VISIT,EST,40-64: ICD-10-PCS | Mod: S$GLB,,, | Performed by: INTERNAL MEDICINE

## 2023-09-01 PROCEDURE — 3074F SYST BP LT 130 MM HG: CPT | Mod: CPTII,S$GLB,, | Performed by: INTERNAL MEDICINE

## 2023-09-01 PROCEDURE — 1159F PR MEDICATION LIST DOCUMENTED IN MEDICAL RECORD: ICD-10-PCS | Mod: CPTII,S$GLB,, | Performed by: INTERNAL MEDICINE

## 2023-09-01 PROCEDURE — 3008F BODY MASS INDEX DOCD: CPT | Mod: CPTII,S$GLB,, | Performed by: INTERNAL MEDICINE

## 2023-09-01 PROCEDURE — 99999 PR PBB SHADOW E&M-EST. PATIENT-LVL IV: ICD-10-PCS | Mod: PBBFAC,,, | Performed by: INTERNAL MEDICINE

## 2023-09-01 PROCEDURE — 99999 PR PBB SHADOW E&M-EST. PATIENT-LVL IV: CPT | Mod: PBBFAC,,, | Performed by: INTERNAL MEDICINE

## 2023-09-01 PROCEDURE — 3074F PR MOST RECENT SYSTOLIC BLOOD PRESSURE < 130 MM HG: ICD-10-PCS | Mod: CPTII,S$GLB,, | Performed by: INTERNAL MEDICINE

## 2023-09-01 PROCEDURE — 3008F PR BODY MASS INDEX (BMI) DOCUMENTED: ICD-10-PCS | Mod: CPTII,S$GLB,, | Performed by: INTERNAL MEDICINE

## 2023-09-01 PROCEDURE — 1159F MED LIST DOCD IN RCRD: CPT | Mod: CPTII,S$GLB,, | Performed by: INTERNAL MEDICINE

## 2023-09-01 PROCEDURE — 99396 PREV VISIT EST AGE 40-64: CPT | Mod: S$GLB,,, | Performed by: INTERNAL MEDICINE

## 2023-09-01 PROCEDURE — 3079F PR MOST RECENT DIASTOLIC BLOOD PRESSURE 80-89 MM HG: ICD-10-PCS | Mod: CPTII,S$GLB,, | Performed by: INTERNAL MEDICINE

## 2023-09-01 NOTE — PROGRESS NOTES
Subjective:     HPI  Star Saeed is a 43 y.o. male with medical diagnoses as listed in the medical history and problem list that presents for above complaint(s).    Some diminishment in physical activity this summer - Will restart biking     No major changes since last year notably    family history of HTN, heart disease (early CAD in his grandparents)    Patient Care Team:  Ander Melton MD as PCP - General (Internal Medicine)      PAST MEDICAL HISTORY:  Past Medical History:   Diagnosis Date    Thyroid disorder        PAST SURGICAL HISTORY:  Past Surgical History:   Procedure Laterality Date    ADENOIDECTOMY      TONSILLECTOMY         SOCIAL HISTORY:  Social History     Socioeconomic History    Marital status:    Occupational History     Employer: [employer].    Tobacco Use    Smoking status: Never    Smokeless tobacco: Never   Substance and Sexual Activity    Alcohol use: Yes     Comment: 1-2x/day    Drug use: Never    Sexual activity: Yes     Partners: Female     Birth control/protection: I.U.D.   Social History Narrative    Rides 75 miles per week (bikes) to work        FAMILY HISTORY:  Family History   Problem Relation Age of Onset    Cancer Mother         breast    Hypertension Father     Anxiety disorder Father     ADD / ADHD Brother     Depression Brother     Cancer Maternal Grandmother     Breast cancer Maternal Grandmother     Coronary artery disease Maternal Grandfather     Hypertension Maternal Grandfather     Coronary artery disease Paternal Grandfather        ALLERGIES AND MEDICATIONS: updated and reviewed.  Review of patient's allergies indicates:  No Known Allergies  No current outpatient medications on file.     No current facility-administered medications for this visit.     Review of Systems   HENT:  Negative for hearing loss.    Eyes:  Negative for discharge.   Respiratory:  Negative for wheezing.    Cardiovascular:  Negative for chest pain and palpitations.  "  Gastrointestinal:  Negative for blood in stool, constipation, diarrhea and vomiting.   Genitourinary:  Negative for hematuria and urgency.   Musculoskeletal:  Negative for neck pain.   Neurological:  Negative for weakness and headaches.   Endo/Heme/Allergies:  Negative for polydipsia.       Objective:       Physical Exam  Vitals:    09/01/23 0720   BP: 112/80   Pulse: 66   Temp: 98.2 °F (36.8 °C)   TempSrc: Oral   SpO2: 96%   Weight: 78 kg (171 lb 15.3 oz)   Height: 5' 7" (1.702 m)    Body mass index is 26.93 kg/m².  Weight: 78 kg (171 lb 15.3 oz)   Height: 5' 7" (170.2 cm)   Physical Exam  Vitals reviewed.   Constitutional:       General: He is not in acute distress.     Appearance: He is well-developed.   HENT:      Head: Normocephalic and atraumatic.      Right Ear: External ear normal.      Left Ear: External ear normal.      Nose: Nose normal.      Mouth/Throat:      Mouth: Mucous membranes are moist.      Pharynx: No posterior oropharyngeal erythema.   Eyes:      Pupils: Pupils are equal, round, and reactive to light.   Neck:      Thyroid: No thyromegaly.   Cardiovascular:      Rate and Rhythm: Normal rate.      Heart sounds: Normal heart sounds. No murmur heard.  Pulmonary:      Effort: Pulmonary effort is normal. No respiratory distress.      Breath sounds: Normal breath sounds. No stridor.   Abdominal:      General: Bowel sounds are normal. There is no distension.      Palpations: Abdomen is soft. There is no mass.      Tenderness: There is no abdominal tenderness. There is no guarding.      Hernia: No hernia is present.   Musculoskeletal:         General: No tenderness. Normal range of motion.      Cervical back: Normal range of motion and neck supple.   Lymphadenopathy:      Cervical: No cervical adenopathy.   Skin:     General: Skin is warm and dry.      Findings: No erythema or rash.   Neurological:      Mental Status: He is alert.      Cranial Nerves: No cranial nerve deficit.   Psychiatric:         " Behavior: Behavior normal.             Assessment:     1. Routine adult health maintenance    2. History of thyroid disorder        Plan:     Star was seen today for annual exam.    Diagnoses and all orders for this visit:    Routine adult health maintenance  Wears glasses  History of thyroid disorder  Labs in 2022 excellent. No need for updated labs at this time. Discussed healthy diet, regular exercise,  age appropriate cancer screening, and routine vaccinations.    -     TSH; Future  -     Lipid Panel; Future  -     Comprehensive Metabolic Panel; Future  -     CBC Auto Differential; Future    Health Maintenance reviewed, addressed as per orders    F/u in 1 year      1. The patient indicates understanding of these issues and agrees with the plan. Brief care plan is updated and reviewed with the patient as applicable.   2. The patient is given an After Visit Summary that lists all medications with directions, allergies, orders placed during this encounter and follow-up instructions.   3. I have reviewed the patient's medical information including past medical, family, and social history sections including the medications and allergies.   4. We discussed the patient's current medications. I reconciled the patient's medication list and prepared and supplied needed refills.       Ander Melton MD  Internal Medicine-Pediatrics

## 2024-05-06 ENCOUNTER — PATIENT MESSAGE (OUTPATIENT)
Dept: ADMINISTRATIVE | Facility: HOSPITAL | Age: 44
End: 2024-05-06
Payer: COMMERCIAL

## 2024-08-13 ENCOUNTER — LAB VISIT (OUTPATIENT)
Dept: LAB | Facility: HOSPITAL | Age: 44
End: 2024-08-13
Attending: INTERNAL MEDICINE
Payer: COMMERCIAL

## 2024-08-13 ENCOUNTER — OFFICE VISIT (OUTPATIENT)
Dept: FAMILY MEDICINE | Facility: CLINIC | Age: 44
End: 2024-08-13
Payer: COMMERCIAL

## 2024-08-13 VITALS
HEIGHT: 67 IN | TEMPERATURE: 99 F | HEART RATE: 64 BPM | DIASTOLIC BLOOD PRESSURE: 82 MMHG | OXYGEN SATURATION: 98 % | SYSTOLIC BLOOD PRESSURE: 130 MMHG | WEIGHT: 166.13 LBS | BODY MASS INDEX: 26.08 KG/M2

## 2024-08-13 DIAGNOSIS — Z12.11 COLON CANCER SCREENING: ICD-10-CM

## 2024-08-13 DIAGNOSIS — Z00.00 ROUTINE ADULT HEALTH MAINTENANCE: Primary | ICD-10-CM

## 2024-08-13 DIAGNOSIS — Z86.39 HISTORY OF THYROID DISORDER: ICD-10-CM

## 2024-08-13 DIAGNOSIS — Z00.00 ROUTINE ADULT HEALTH MAINTENANCE: ICD-10-CM

## 2024-08-13 DIAGNOSIS — Z80.3 FAMILY HISTORY OF BREAST CANCER IN FIRST DEGREE RELATIVE: ICD-10-CM

## 2024-08-13 LAB
ALBUMIN SERPL BCP-MCNC: 4.4 G/DL (ref 3.5–5.2)
ALP SERPL-CCNC: 36 U/L (ref 55–135)
ALT SERPL W/O P-5'-P-CCNC: 22 U/L (ref 10–44)
ANION GAP SERPL CALC-SCNC: 8 MMOL/L (ref 8–16)
AST SERPL-CCNC: 15 U/L (ref 10–40)
BASOPHILS # BLD AUTO: 0.03 K/UL (ref 0–0.2)
BASOPHILS NFR BLD: 0.4 % (ref 0–1.9)
BILIRUB SERPL-MCNC: 0.6 MG/DL (ref 0.1–1)
BUN SERPL-MCNC: 13 MG/DL (ref 6–20)
CALCIUM SERPL-MCNC: 9.6 MG/DL (ref 8.7–10.5)
CHLORIDE SERPL-SCNC: 107 MMOL/L (ref 95–110)
CHOLEST SERPL-MCNC: 174 MG/DL (ref 120–199)
CHOLEST/HDLC SERPL: 3.2 {RATIO} (ref 2–5)
CO2 SERPL-SCNC: 24 MMOL/L (ref 23–29)
COMPLEXED PSA SERPL-MCNC: 0.58 NG/ML (ref 0–4)
CREAT SERPL-MCNC: 0.9 MG/DL (ref 0.5–1.4)
DIFFERENTIAL METHOD BLD: ABNORMAL
EOSINOPHIL # BLD AUTO: 0.1 K/UL (ref 0–0.5)
EOSINOPHIL NFR BLD: 1.4 % (ref 0–8)
ERYTHROCYTE [DISTWIDTH] IN BLOOD BY AUTOMATED COUNT: 12.8 % (ref 11.5–14.5)
EST. GFR  (NO RACE VARIABLE): >60 ML/MIN/1.73 M^2
ESTIMATED AVG GLUCOSE: 91 MG/DL (ref 68–131)
GLUCOSE SERPL-MCNC: 87 MG/DL (ref 70–110)
HBA1C MFR BLD: 4.8 % (ref 4–5.6)
HCT VFR BLD AUTO: 42.2 % (ref 40–54)
HDLC SERPL-MCNC: 55 MG/DL (ref 40–75)
HDLC SERPL: 31.6 % (ref 20–50)
HGB BLD-MCNC: 13.6 G/DL (ref 14–18)
IMM GRANULOCYTES # BLD AUTO: 0.04 K/UL (ref 0–0.04)
IMM GRANULOCYTES NFR BLD AUTO: 0.5 % (ref 0–0.5)
LDLC SERPL CALC-MCNC: 80 MG/DL (ref 63–159)
LYMPHOCYTES # BLD AUTO: 2.2 K/UL (ref 1–4.8)
LYMPHOCYTES NFR BLD: 28 % (ref 18–48)
MCH RBC QN AUTO: 29.9 PG (ref 27–31)
MCHC RBC AUTO-ENTMCNC: 32.2 G/DL (ref 32–36)
MCV RBC AUTO: 93 FL (ref 82–98)
MONOCYTES # BLD AUTO: 0.7 K/UL (ref 0.3–1)
MONOCYTES NFR BLD: 8.8 % (ref 4–15)
NEUTROPHILS # BLD AUTO: 4.8 K/UL (ref 1.8–7.7)
NEUTROPHILS NFR BLD: 60.9 % (ref 38–73)
NONHDLC SERPL-MCNC: 119 MG/DL
NRBC BLD-RTO: 0 /100 WBC
PLATELET # BLD AUTO: 222 K/UL (ref 150–450)
PMV BLD AUTO: 11.4 FL (ref 9.2–12.9)
POTASSIUM SERPL-SCNC: 4.4 MMOL/L (ref 3.5–5.1)
PROT SERPL-MCNC: 7.4 G/DL (ref 6–8.4)
RBC # BLD AUTO: 4.55 M/UL (ref 4.6–6.2)
SODIUM SERPL-SCNC: 139 MMOL/L (ref 136–145)
T4 FREE SERPL-MCNC: 0.97 NG/DL (ref 0.71–1.51)
TRIGL SERPL-MCNC: 195 MG/DL (ref 30–150)
TSH SERPL DL<=0.005 MIU/L-ACNC: 4.53 UIU/ML (ref 0.4–4)
WBC # BLD AUTO: 7.81 K/UL (ref 3.9–12.7)

## 2024-08-13 PROCEDURE — 99396 PREV VISIT EST AGE 40-64: CPT | Mod: S$GLB,,, | Performed by: INTERNAL MEDICINE

## 2024-08-13 PROCEDURE — 1159F MED LIST DOCD IN RCRD: CPT | Mod: CPTII,S$GLB,, | Performed by: INTERNAL MEDICINE

## 2024-08-13 PROCEDURE — 99999 PR PBB SHADOW E&M-EST. PATIENT-LVL IV: CPT | Mod: PBBFAC,,, | Performed by: INTERNAL MEDICINE

## 2024-08-13 PROCEDURE — 84439 ASSAY OF FREE THYROXINE: CPT | Performed by: INTERNAL MEDICINE

## 2024-08-13 PROCEDURE — 80061 LIPID PANEL: CPT | Performed by: INTERNAL MEDICINE

## 2024-08-13 PROCEDURE — 36415 COLL VENOUS BLD VENIPUNCTURE: CPT | Mod: PO | Performed by: INTERNAL MEDICINE

## 2024-08-13 PROCEDURE — 3008F BODY MASS INDEX DOCD: CPT | Mod: CPTII,S$GLB,, | Performed by: INTERNAL MEDICINE

## 2024-08-13 PROCEDURE — 3044F HG A1C LEVEL LT 7.0%: CPT | Mod: CPTII,S$GLB,, | Performed by: INTERNAL MEDICINE

## 2024-08-13 PROCEDURE — 84153 ASSAY OF PSA TOTAL: CPT | Performed by: INTERNAL MEDICINE

## 2024-08-13 PROCEDURE — 85025 COMPLETE CBC W/AUTO DIFF WBC: CPT | Performed by: INTERNAL MEDICINE

## 2024-08-13 PROCEDURE — 3079F DIAST BP 80-89 MM HG: CPT | Mod: CPTII,S$GLB,, | Performed by: INTERNAL MEDICINE

## 2024-08-13 PROCEDURE — 84443 ASSAY THYROID STIM HORMONE: CPT | Performed by: INTERNAL MEDICINE

## 2024-08-13 PROCEDURE — 3075F SYST BP GE 130 - 139MM HG: CPT | Mod: CPTII,S$GLB,, | Performed by: INTERNAL MEDICINE

## 2024-08-13 PROCEDURE — 80053 COMPREHEN METABOLIC PANEL: CPT | Performed by: INTERNAL MEDICINE

## 2024-08-13 PROCEDURE — 83036 HEMOGLOBIN GLYCOSYLATED A1C: CPT | Performed by: INTERNAL MEDICINE

## 2024-08-13 NOTE — PROGRESS NOTES
Subjective:     Chief Complaint   Patient presents with    Annual Exam       HPI  Star Saeed is a 44 y.o. male with medical diagnoses as listed in the medical history and problem list that presents for above complaint(s).    Bikes daily to work   Lost 5 lbs since last year    Occasional nosebleeds    Patient Care Team:  Ander Melton MD as PCP - General (Internal Medicine)      PAST MEDICAL HISTORY:  Past Medical History:   Diagnosis Date    Thyroid disorder        PAST SURGICAL HISTORY:  Past Surgical History:   Procedure Laterality Date    ADENOIDECTOMY      TONSILLECTOMY         SOCIAL HISTORY:  Social History     Socioeconomic History    Marital status:    Occupational History     Employer: [employer].    Tobacco Use    Smoking status: Never    Smokeless tobacco: Never   Substance and Sexual Activity    Alcohol use: Yes     Comment: 1-2x/day    Drug use: Never    Sexual activity: Yes     Partners: Female     Birth control/protection: I.U.D.   Social History Narrative    Rides 75 miles per week (bikes) to work      Social Determinants of Health     Financial Resource Strain: Patient Declined (8/10/2024)    Overall Financial Resource Strain (CARDIA)     Difficulty of Paying Living Expenses: Patient declined   Food Insecurity: Patient Declined (8/10/2024)    Hunger Vital Sign     Worried About Running Out of Food in the Last Year: Patient declined     Ran Out of Food in the Last Year: Patient declined   Physical Activity: Sufficiently Active (8/10/2024)    Exercise Vital Sign     Days of Exercise per Week: 7 days     Minutes of Exercise per Session: 60 min   Stress: No Stress Concern Present (8/10/2024)    [employer] - Occupational Stress Questionnaire     Feeling of Stress : Only a little   Housing Stability: Unknown (8/10/2024)    Housing Stability Vital Sign     Unable to Pay for Housing in the Last Year: Patient declined       FAMILY HISTORY:  Family History  "  Problem Relation Name Age of Onset    Cancer Mother          breast    Hypertension Father      Anxiety disorder Father      ADD / ADHD Brother      Depression Brother      Cancer Maternal Grandmother      Breast cancer Maternal Grandmother      Coronary artery disease Maternal Grandfather      Hypertension Maternal Grandfather      Coronary artery disease Paternal Grandfather         ALLERGIES AND MEDICATIONS: updated and reviewed.  Review of patient's allergies indicates:   Allergen Reactions    Allergen ext-venom-honey bee Anaphylaxis     No current outpatient medications on file.     No current facility-administered medications for this visit.         Objective:       Physical Exam  Vitals:    08/13/24 1453   BP: 130/82   Pulse: 64   Temp: 98.5 °F (36.9 °C)   TempSrc: Oral   SpO2: 98%   Weight: 75.4 kg (166 lb 1.9 oz)   Height: 5' 7" (1.702 m)    Body mass index is 26.02 kg/m².  Weight: 75.4 kg (166 lb 1.9 oz)   Height: 5' 7" (170.2 cm)   Physical Exam  Vitals reviewed.   Constitutional:       General: He is not in acute distress.     Appearance: Normal appearance. He is well-developed.   HENT:      Head: Normocephalic and atraumatic.      Right Ear: External ear normal.      Left Ear: External ear normal.      Nose: Nose normal. No congestion.      Mouth/Throat:      Mouth: Mucous membranes are moist.      Pharynx: No oropharyngeal exudate.   Eyes:      General:         Right eye: No discharge.         Left eye: No discharge.      Extraocular Movements: Extraocular movements intact.      Pupils: Pupils are equal, round, and reactive to light.   Neck:      Thyroid: No thyromegaly.   Cardiovascular:      Rate and Rhythm: Normal rate and regular rhythm.      Heart sounds: Normal heart sounds. No murmur heard.     No gallop.   Pulmonary:      Effort: Pulmonary effort is normal. No respiratory distress.      Breath sounds: Normal breath sounds. No stridor.   Abdominal:      General: Bowel sounds are normal. There " is no distension.      Palpations: Abdomen is soft. There is no mass.      Tenderness: There is no abdominal tenderness. There is no guarding.      Hernia: No hernia is present.   Musculoskeletal:         General: No tenderness or signs of injury. Normal range of motion.      Cervical back: Normal range of motion and neck supple. No rigidity or tenderness.      Right lower leg: No edema.      Left lower leg: No edema.   Lymphadenopathy:      Cervical: No cervical adenopathy.   Skin:     General: Skin is warm and dry.      Coloration: Skin is not jaundiced.      Findings: No bruising, erythema, lesion or rash.   Neurological:      Mental Status: He is alert.      Cranial Nerves: No cranial nerve deficit.   Psychiatric:         Mood and Affect: Mood normal.         Behavior: Behavior normal.             Assessment:     1. Routine adult health maintenance    2. History of thyroid disorder    3. Colon cancer screening    4. Family history of breast cancer in first degree relative      Plan:     Star was seen today for annual exam.    Diagnoses and all orders for this visit:    Routine adult health maintenance  Discussed healthy diet, regular exercise, necessary labs, age appropriate cancer screening, and routine vaccinations.    -     Hemoglobin A1C; Future  -     CBC Auto Differential; Future  -     Comprehensive Metabolic Panel; Future  -     Lipid Panel; Future  -     PSA, Screening; Future    History of thyroid disorder  Patient on thyroid replacement therapy with recent thyroid indices discussed and ordered - continue current Synthroid dosage  -     TSH; Future    Colon cancer screening  Adults 45 to 75 years of age should be screened for colorectal cancer. Colonoscopy and fecal stool testing discussed with respect to risks and benefits/advantages and disadvantages of each screening method. Patient aware that it is recommended that a positive Cologuard screen or FIT test be clinically correlated and followed-up  with a structural examination of the colon such asdiagnostic colonoscopy. Patient consents to colon cancer screening method as follows  -     Cologuard Screening (Multitarget Stool DNA); Future  -     Cologuard Screening (Multitarget Stool DNA)    Family history of breast cancer in first degree relative  Given family history, patient amenable to cancer genetics discussion  -     Ambulatory referral/consult to Genetics; Future        Health Maintenance reviewed, addressed as per orders    F/u in 1 year      1. The patient indicates understanding of these issues and agrees with the plan. Brief care plan is updated and reviewed with the patient as applicable.   2. The patient is given an After Visit Summary that lists all medications with directions, allergies, orders placed during this encounter and follow-up instructions.   3. I have reviewed the patient's medical information including past medical, family, and social history sections including the medications and allergies.   4. We discussed the patient's current medications. I reconciled the patient's medication list and prepared and supplied needed refills.       Ander Melton MD  Internal Medicine-Pediatrics

## 2025-01-02 ENCOUNTER — PATIENT MESSAGE (OUTPATIENT)
Dept: HEMATOLOGY/ONCOLOGY | Facility: CLINIC | Age: 45
End: 2025-01-02
Payer: COMMERCIAL

## 2025-01-02 ENCOUNTER — TELEPHONE (OUTPATIENT)
Dept: HEMATOLOGY/ONCOLOGY | Facility: CLINIC | Age: 45
End: 2025-01-02
Payer: COMMERCIAL

## 2025-01-02 NOTE — TELEPHONE ENCOUNTER
Called and spoke to patient to reschedule the genetic visit on 1/27/25 to 1/30/25 and changed to a virtual visit.

## 2025-01-23 ENCOUNTER — TELEPHONE (OUTPATIENT)
Dept: HEMATOLOGY/ONCOLOGY | Facility: CLINIC | Age: 45
End: 2025-01-23
Payer: COMMERCIAL

## 2025-01-23 NOTE — TELEPHONE ENCOUNTER
Called and spoke to patient to move his virtual visit sooner to 1/24/25 and to complete the genetic questionnaire.

## 2025-01-24 ENCOUNTER — OFFICE VISIT (OUTPATIENT)
Dept: HEMATOLOGY/ONCOLOGY | Facility: CLINIC | Age: 45
End: 2025-01-24
Payer: COMMERCIAL

## 2025-01-24 DIAGNOSIS — Z71.83 ENCOUNTER FOR NONPROCREATIVE GENETIC COUNSELING: Primary | ICD-10-CM

## 2025-01-24 DIAGNOSIS — Z80.3 FAMILY HISTORY OF BREAST CANCER IN FIRST DEGREE RELATIVE: ICD-10-CM

## 2025-01-24 NOTE — PROGRESS NOTES
Hereditary/High Risk Clinic  Department of Hematology and Oncology  [facility]    Cancer Genetics  Initial Consultation    Date of Service:  25  Visit Provider:  Mayelin Hagen PA-C  Collaborating Physician:  Cecilia Brandt MD    Patient:  Star Saeed  :  1980  MRN:   [id number]     Referring Provider    Ander Melton MD  [address]  [place] [address]    Televisit Information:  Patient location: [place]  Visit type: Audiovisual  Face-to-face time with patient: approximately 30 minutes.  Approximately 55 minutes in total were spent on this encounter, which includes face-to-face time and non-face-to-face time preparing to see the patient (e.g., review of tests), obtaining and/or reviewing separately obtained history, documenting clinical information in the electronic or other health record, independently interpreting results (not separately reported) and communicating results to the patient/family/caregiver, or care coordination (not separately reported).  Each patient to whom he or she provides medical services by telemedicine is:  (1) informed of the relationship between the physician and patient and the respective role of any other health care provider with respect to management of the patient; and (2) notified that he or she may decline to receive medical services by telemedicine and may withdraw from such care at any time.    ASSESSMENT   Star Saeed personal/family history is significant for:  Star: No cancers  Mother: Breast cancer 35,  from metastatic recurrence at 43 (), no genetic testing  MGM: Breast cancer dx/ at 55    The recommendation is to proceed with germline testing to include the genes commonly associated with hereditary breast cancer. Star was given the option of proceeding with testing now, deferring testing to a later date, or declining testing and opted to proceed.     PLAN     1. Encounter for nonprocreative genetic  counseling    2. Family history of breast cancer in first degree relative  - Ambulatory referral/consult to Genetics  - [facility] - Hereditary Cancer with RNA; Future      - Proceed with germline genetic testing as noted below.   - Follow up for results review, post-test genetic counseling.      Genetics lab: [facility]   Genetic test: [facility] CancerNext +[facility]   Indication/ICD Codes:  FH breast cancer before age 50 (Z80.3)   Specimen type: Blood   Specimen collection by: [facility]   Specimen collection date: 25   Results expected by: Approximately 3 weeks after the genetic testing lab receives the specimen   Results disclosure plan: Notification by genetics team and post-test visit    Verbal informed consent: Obtained       Questions were encouraged and answered to the patient's satisfaction, and he verbalized understanding of information and agreement with the plan.       SUBJECTIVE   HPI:  Star Saeed is a 44 y.o. assigned male at birth who is new to the [facility] Department of Hematology and Oncology and to me.  He presents for genetic counseling and cancer risk assessment due to his personal and/or family history of cancer.     Focused personal history:   Germline cancer-genetic testing: no  Personal history of cancer:  no  Benign tumors:  no  Blood disorder:  no  Pancreatitis:  no    Cancer Screening:   Colonoscopy: Plans to do one soon  PSA: 0.58 in 2024    Family History  Ashkenazi Tenriism:  No  Consanguinity:  No  Hereditary cancer genetic testing in blood relatives:  No  Pertinent family history:   Mother: Breast cancer 35,  from metastatic recurrence at 43 (), no genetic testing  MGM: Breast cancer dx/ at 55        A larger copy of the pedigree is available in Media.       Social History     Tobacco Use    Smoking status: Never    Smokeless tobacco: Never   Substance Use Topics    Alcohol use: Yes     Comment: 1-2x/day     Past Medical History:   Diagnosis Date     Thyroid disorder      Review of Systems  See HPI.      OBJECTIVE   Physical Exam  Limited secondary to the inherent nature of a virtual visit.  Very pleasant patient.  Constitutional: No apparent distress.   Neurological: Alert and oriented.   Psychiatric: Normal mood, affect, thought content, speech, behavior, judgment.    COUNSELING     CANCER GENETICS    Cancer is caused by an accumulation of genetic mutations that allow cells to grow and divide uncontrollably to form a tumor. Individuals with certain risk factors are more likely to develop genetic mutations that lead to cancer.      Age: Advancing age is the most important risk factor for cancer overall and for many individual cancer types.    Alcohol: Drinking alcohol can increase your risk of cancer of the mouth, throat, esophagus, larynx, liver, and breast.    Smoking: Tobacco contains benzene and other chemicals and can cause cancer of the lung, larynx, mouth, esophagus, throat, bladder, kidney, liver, stomach, pancreas, colon and rectum, and cervix, as well as acute myeloid leukemia.    Chemicals: Asbestos, benzene, vinyl chloride, pesticides, fertilizer, wood dust, radon, aflatoxin (a food contaminant), arsenic (a drinking water contaminant), etc.  Radiation: Ultraviolet (sun, tanning beds) and ionizing radiation (radiation therapy, xrays, CT scans).  Chronic inflammation: Chronic infections (Sridevi-Barr virus, HPV, hepatitis B and C, H. Pylori), abnormal immune reactions, and conditions like obesity and inflammatory bowel disease can cause DNA damage and cancer.   Hormones: Combined hormone therapy (estrogen and progestin) increases the risk for breast cancer. Hormone therapy with estrogen alone increases the risk for endometrial cancer. Diethylstilbestrol (SABINA) is a form of estrogen given to some pregnant women from 7915-6199 to prevent premature labor, miscarriages, and other problems. Women who took SABINA have an increased risk for breast cancers and  their daughters have an increased risk for vaginal and cervical cancer.   Medical conditions: Fatty liver disease, cirrhosis, type 2 diabetes, metabolic syndrome (obesity, high blood pressure, high cholesterol, insuline resistance).   Immunosuppression: Due to condition (HIV/AIDS) or taking immunosuppressive medications (after organ transplant, etc).     While most individuals have a family history of cancer, only 5-10% of cancers are hereditary, meaning they are caused by inherited genetic mutations. The remaining cancers are sporadic, meaning they are caused by mutations acquired during the individual's life as a result of aging, environmental exposures, and other causes.     Sporadic cancer (75-80%): Sporadic cancers are random occurrences caused by an accumulation of genetic mutations acquired during the individual's lifetime.   Hereditary cancer (5-10%): Hereditary cancers are caused by a combination of acquired mutations and an inherited mutation in a single gene. A family with a hereditary cancer syndrome will typically have individuals in every generation with the cancers caused by that gene, often diagnosed 10-20 years younger than usual.   Familial cancer (20%): Familial cancer refers to a clustering a cancer in a family that may be more than you would expect to see by chance, but they do not have an inherited mutation in a single gene that caused the cancers. Instead, their cancers are caused by a combination of shared genetic, environmental, and lifestyle factors.      GERMLINE GENETIC TESTING    Purpose:  Analyze specific genes for inherited mutations.     Results: Positive, negative, and uncertain. A positive result means a cancer-causing mutation was detected. A negative result means no cancer-causing mutations were detected. Uncertain means a genetic change was detected but it is not known if that change results in an increased risk for cancer.     Benefits: When an individual is aware that they have  a germline mutation that increases their risk for certain cancers, they can engage in the recommended screening and risk-reduction strategies.     Risks: Genetic discrimination occurs when any entity uses an individual's genetic information to make a decision that negatively impacts them. Examples would include an insurance refusing to issue a policy or an employer refusing to hire someone because they have a germline genetic mutation. The Genetic Information Nondiscrimination Act of 2008 (PETRA) is a federal law that protects healthcare insurance and some employment. This law does not protect elective insurance such as life insurance, long- and short-term disability insurance, long-term care insurance, and cancer policies.     Cost: Many insurance policies cover genetic testing. With [facility] and [facility], most individuals with insurance pay $0-100 out of pocket for testing with a max of $250. The cash price for testing is $250. Both labs offer financial assistance.     REFERENCES   National Comprehensive Cancer Network (NCCN). (2024). Genetic/familial high-risk assessment: Breast, ovarian, and pancreatic. NCCN Clinical Practice Guidelines in Oncology (NCCN Guidelines), Version 1.2022.      MANFRED Narvaez, PAShaeC  Physician Assistant, Hereditary & High Risk Clinic  Hematology Oncology, [facility]

## 2025-02-13 ENCOUNTER — PATIENT MESSAGE (OUTPATIENT)
Dept: HEMATOLOGY/ONCOLOGY | Facility: CLINIC | Age: 45
End: 2025-02-13
Payer: COMMERCIAL

## 2025-02-17 ENCOUNTER — LAB VISIT (OUTPATIENT)
Dept: LAB | Facility: HOSPITAL | Age: 45
End: 2025-02-17
Attending: PHYSICIAN ASSISTANT
Payer: COMMERCIAL

## 2025-02-17 DIAGNOSIS — Z80.3 FAMILY HISTORY OF BREAST CANCER IN FIRST DEGREE RELATIVE: ICD-10-CM

## 2025-02-17 PROCEDURE — 36415 COLL VENOUS BLD VENIPUNCTURE: CPT | Performed by: PHYSICIAN ASSISTANT

## 2025-03-07 ENCOUNTER — TELEPHONE (OUTPATIENT)
Dept: HEMATOLOGY/ONCOLOGY | Facility: CLINIC | Age: 45
End: 2025-03-07
Payer: COMMERCIAL

## 2025-03-07 ENCOUNTER — RESULTS FOLLOW-UP (OUTPATIENT)
Dept: HEMATOLOGY/ONCOLOGY | Facility: CLINIC | Age: 45
End: 2025-03-07

## 2025-03-07 NOTE — TELEPHONE ENCOUNTER
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 I left a voice message for Star and will send him a portal message to set up a post-test visit to discuss the results and implications for him and his relatives.       Mayelin Hagen PA-C  Cancer Genetics  Hereditary/High Risk Clinic  Department of Hematology and Oncology  [facility]

## 2025-03-10 ENCOUNTER — PATIENT MESSAGE (OUTPATIENT)
Dept: HEMATOLOGY/ONCOLOGY | Facility: CLINIC | Age: 45
End: 2025-03-10

## 2025-03-10 ENCOUNTER — OFFICE VISIT (OUTPATIENT)
Dept: HEMATOLOGY/ONCOLOGY | Facility: CLINIC | Age: 45
End: 2025-03-10
Payer: COMMERCIAL

## 2025-03-10 DIAGNOSIS — Z80.3 FAMILY HISTORY OF BREAST CANCER: ICD-10-CM

## 2025-03-10 DIAGNOSIS — R79.89 ABNORMAL TSH: ICD-10-CM

## 2025-03-10 DIAGNOSIS — Z71.83 ENCOUNTER FOR NONPROCREATIVE GENETIC COUNSELING: Primary | ICD-10-CM

## 2025-03-10 DIAGNOSIS — Z15.89 CHEK2 GENE MUTATION POSITIVE: ICD-10-CM

## 2025-03-10 PROCEDURE — 98003 SYNCH AUDIO-VIDEO NEW HI 60: CPT | Mod: 95,,, | Performed by: PHYSICIAN ASSISTANT

## 2025-03-10 NOTE — LETTER
Family Notification Letter    Star Saeed underwent genetic testing that revealed an inherited mutation in a gene called CHEK2 that is primarily associated with an increased risk for female breast cancer but is thought to increase the risk for prostate cancer and possibly other cancers, such as thyroid cancer.     People with this mutation need to undergo specific cancer screening in order to detect a cancer early when it is treatable and curable. Inherited mutations are passed directly from parent to child, with each child having a 50% chance of inheriting the mutation. Therefore, this mutation could be present in Star's parent, siblings, children and other more distant relatives. Genetic testing is recommended for relatives age 18 and older. Cascade testing is advised, which means the parent is tested first. Mutations do not skip generations, so if a parent tests negative, their children cannot have the mutation and do not need testing for it.     How to get testing:   You will need to see a genetics provider who will provide pre-test counseling and order the testing. They will review your personal and family family and discuss the costs, risks and benefits of genetic testing. If you opt to proceed with testing, the provider will make sure you are tested for all of the genes that need to be tested based on your personal and family history. After testing is complete, the provider will review the results with you and provide you with a personalized cancer risk assessment, recommendations, and any referrals that are needed. If the genetics provider determines that you only need testing for the CHEK2 mutation, [facility] will test you for the CHEK2 mutation for free if your specimen is received by May 29, 2025 (90 days after Star's results date). If your personal or family history warrants testing of other genes, your insurance will be billed for those other genes.    For individuals located in  "[place]:   [facility] in-person [facility] appointments in [facility], and [place]. Virtual appointments are available to anyone located in [place] at the time of the visit who has access to the [facility] portal on their computer, tablet, or smart phone.  Call [facility] at [phone] to schedule an appointment. Tell the  you qualify for an "urgent" appointment because your relative has a genetic mutation. You can also ask Star to send me a [facility] message with your name,  and phone number and we will contact you to schedule.     For individuals located outside of [place]:   Visit [email or web address] (National Society of Genetic Counselors) and click Find a Genetic Counselor to find a provider in your area.     Please give this letter and a copy of Star's results report to your genetics provider.   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 However, pathogenic mutations (harmful changes) in the gene can prevent it from working properly, leading to a higher risk of certain types of cancer. Most cancers are caused by mutations that are acquired during a person's lifetime. A small percentage of cancers are caused by inherited (germline) mutations.      Cancer Risks   Cancer risk estimates are based on a specific type of mutation called a frameshift. Risks for other types of mutations (missense) are unclear but may be lower, particularly for breast cancer. An individuals risk may vary based on personal or family history of cancer as well as other personal risk factors.    "   Estimated lifetime risks:    Cancer Type  General Population  CHEK2+    Breast (Female) 12%  23-27%    Prostate  12%  Preliminary evidence suggests an increased risk of prostate cancer      Other cancers:   There may be an increased risk for thyroid, ovarian, kidney, stomach or male breast cancer. However, these risks are not well established.   Previous studies suggested an increased risk for colorectal cancer (5-10% compared to 5% in the general population). However, more recent studies and guideline updates indicate that there is no significantly increased risk. NCCN recommends general population screening guidelines for colorectal cancer.      Management Recommendations   Breast (Female)   Referral to a breast specialist (high risk breast clinic) for full risk assessment and management plan.   Breast awareness beginning at age 18. Individuals should be familiar with their breasts and promptly report changes to their health care provider. Periodic, consistent breast self-examination (BSE) may facilitate breast awareness.   Clinical breast exams every 6-12 months starting at age 25.   Yearly mammogram starting at age 40.*   Consider yearly breast MRI with and without contrast starting at age 30-35.*   Consider risk reduction agents, including discussion of risks and benefits.   There is insufficient evidence for risk reducing mastectomy (surgery to remove the breast tissue).    *Age may be modified based on family history (starting 5-10 years earlier than the youngest age of breast cancer diagnosis in the family).        Other cancers   There are no established screening guidelines.   Prostate cancer: Consider annual screening (PSA with or without digital rectal exam) at age 40.  Thyroid cancer: Consider manual thyroid exam at the time of annual wellness exam.                    Star has a variant in PALB2 that may or may not result in an increased risk for cancer. No one needs testing for the PALB2 mutation  unless it is reclassified as pathogenic/likely pathogenic (cancer-causing).

## 2025-03-10 NOTE — LETTER
Germline CHEK2 Mutations     Background information   CHEK2 is a gene that, when functioning normally, helps protect against cancer. However, pathogenic mutations (harmful changes) in the gene can prevent it from working properly, leading to a higher risk of certain types of cancer. Most cancers are caused by mutations that are acquired during a person's lifetime. A small percentage of cancers are caused by inherited (germline) mutations.      Cancer Risks   Cancer risk estimates are based on a specific type of mutation called a frameshift. Risks for other types of mutations (missense) are unclear but may be lower, particularly for breast cancer. An individuals risk may vary based on personal or family history of cancer as well as other personal risk factors.      Estimated lifetime risks:    Cancer Type  General Population  CHEK2+    Breast (Female) 12%  23-27%    Prostate  12%  Preliminary evidence suggests an increased risk of prostate cancer      Other cancers:   There may be an increased risk for thyroid, ovarian, kidney, stomach or male breast cancer. However, these risks are not well established.   Previous studies suggested an increased risk for colorectal cancer (5-10% compared to 5% in the general population). However, more recent studies and guideline updates indicate that there is no significantly increased risk. NCCN recommends general population screening guidelines for colorectal cancer.      Management Recommendations   Breast (Female)   Referral to a breast specialist (high risk breast clinic) for full risk assessment and management plan.   Breast awareness beginning at age 18. Individuals should be familiar with their breasts and promptly report changes to their health care provider. Periodic, consistent breast self-examination (BSE) may facilitate breast awareness.   Clinical breast exams every 6-12 months starting at age 25.   Yearly mammogram starting at age 40.*   Consider yearly breast MRI  with and without contrast starting at age 30-35.*   Consider risk reduction agents, including discussion of risks and benefits.   There is insufficient evidence for risk reducing mastectomy (surgery to remove the breast tissue).    *Age may be modified based on family history (starting 5-10 years earlier than the youngest age of breast cancer diagnosis in the family).        Other cancers   There are no established screening guidelines.   Prostate cancer: Consider annual screening (PSA with or without digital rectal exam) at age 40.  Thyroid cancer: Consider manual thyroid exam at the time of annual wellness exam.            Risk to Relatives   Individuals typically have two copies of the CHEK2 gene - one copy from each biological parent. If a parent has a mutation in the CHEK2 gene, there is a 50% chance each child will inherit the mutation. It is likely that this mutation was inherited from one parent (although we cannot tell from testing which parent it was inherited from). As such, the siblings and children of someone with a CHEK2 mutation are each expected to have a 50% chance of having the same CHEK2 mutation. More distant relatives are also at risk of having the familial CHEK2 mutation.  Relatives of someone with a CHEK2 mutation should consider meeting with a genetic specialist to discuss testing for the familial mutation.       It is rare but possible that an individual has a new (de annika) mutation not inherited from either parent. If a mutation is de annika, the chance that siblings and more distant relatives would have the familial mutation is much lower.       Reproductive Information   Pre-Implantation Genetic Testing   Some people who have a mutation in CHEK2 want to reduce the chance of passing on that mutation to a child. If an individual uses in-vitro fertilization to get pregnant, genetic testing can be conducted on embryos to determine if they have the familial mutation(s). Embryos that do not can  then be selected for use, reducing the chance of having a child who is affected.  To get more information about this process, individuals with a CHEK2 mutation can speak with a reproductive genetic counselor.   ?   REFERENCES   National Comprehensive Cancer Network (NCCN). (2024). Genetic/familial high-risk assessment: Breast, ovarian, and pancreatic. NCCN Clinical Practice Guidelines in Oncology (NCCN Guidelines), Version 3.2024.   National Comprehensive Cancer Network (NCCN). (2023). Genetic/familial high-risk assessment: Colorectal. NCCN Clinical Practice Guidelines in Oncology (NCCN Guidelines), Version 2.2023.   National Comprehensive Cancer Network (NCCN). (2024). Prostate Cancer Early Detection. NCCN Clinical Practice Guidelines in Oncology (NCCN Guidelines), Version 2.2024.      The mutation you have is:   CHEK2 c.1100delC (p.Y249Nnd*15), heterozygous, pathogenic    Recommendations for you:    - Annual prostate screening with PSA +/- digital rectal exam.    - Annual manual thyroid exam.   - Cancer risk-reduction strategies including exercise and a healthy diet that is high in fruits, vegetables and whole grains, low in red meats, and avoid processed foods.     CHEK2 testing for at-risk relatives:   Your son and brother each have a 50% chance of having this CHEK2 mutation. Your son should be tested before he turns 40. Your brother could test now, but certainly before his daughter turns 25, which is when she would have to start breast cancer screening if she has the mutation.   It is assumed that this mutation came from your mother, given her young breast cancer. Therefore, her sisters should consider testing.             You have a variant in a gene called PALB2 that isn't fully understood at this time and may or may not result in an increased risk for cancer.     PALB2 p.T706I (c.2117C>T), heterozygous, variant of unknown significance  Pathogenic (harmful) PALB2 mutations are associated with an increased  risk for breast, ovarian, prostate, and pancreatic cancer.   It is unknown if the variant you have is pathogenic (harmful) or benign (completely harmless).   ClinVar was reviewed. Three labs are reporting on this variant and all classify it as a variant of uncertain clinical significance due to insufficient evidence on the effect it has on the function on the PALB2 gene.     Variants of uncertain significance (VUS):   We are all born with many variations (mutations) in our genes. The majority of these variations are completely harmless (benign). A small percentage are harmful (pathogenic), meaning they disrupt the function of the gene resulting in cancer or other disease. A variant of uncertain significance (VUS) is a variant that cannot be classified at this time due to insufficient and/or conflicting evidence.   No action is needed for a variant of uncertain significance.   The genetics lab will notify the patient and the ordering provider when the variant is reclassified as either benign or pathogenic. Relatives only need to be tested for the variant if it is reclassified as pathogenic/likely pathogenic.   Approximately 90% of all VUS's are reclassified as benign. Therefore, no clinical decisions should be made based on a VUS, even if the variant seems to explain the cancers in the individual and/or family. In most cases, this is just a coincidence.

## 2025-03-10 NOTE — PROGRESS NOTES
Hereditary/High Risk Clinic  Department of Hematology and Oncology  [facility]    Cancer Genetics  Results Disclosure & Post-Test Counseling    Date of Service:  3/10/25  Visit Provider:  Mayelin Hagen PA-C  Collaborating Physician:  Cecilia Brandt MD    Patient:  Star Saeed  :  1980  MRN:  [id number]     Referring Provider:   Ander Melton MD  [address] [facility]  [place] [address]     Televisit Information:  Patient location: [place], [place]  Visit type: Audiovisual  Face-to-face time with patient: approximately 30 minutes.  Approximately 70 minutes in total were spent on this encounter, which includes face-to-face time and non-face-to-face time preparing to see the patient (e.g., review of tests), obtaining and/or reviewing separately obtained history, documenting clinical information in the electronic or other health record, independently interpreting results (not separately reported) and communicating results to the patient/family/caregiver, or care coordination (not separately reported).  Each patient to whom he or she provides medical services by telemedicine is:  (1) informed of the relationship between the physician and patient and the respective role of any other health care provider with respect to management of the patient; and (2) notified that he or she may decline to receive medical services by telemedicine and may withdraw from such care at any time.    GENETIC TESTING RESULTS        A copy of the results report is available in Media.     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   Studies suggest an increased risk for prostate cancer and possibly other cancers (thyroid, etc), but more research is needed.     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   It is unknown if the variant Star has is pathogenic (harmful) or benign (completely harmless).   [facility] was reviewed. Three labs are reporting on this variant and all classify it as a variant of uncertain clinical significance due to insufficient evidence on the effect it has on the function on the PALB2 gene.     Variants of uncertain significance (VUS):   We are all born with many variations (mutations) in our genes. The majority of these variations are completely harmless (benign). A small percentage are harmful (pathogenic), meaning they disrupt the function of the gene resulting in cancer or other disease. A variant of uncertain significance (VUS) is a variant that cannot be classified at this time due to insufficient and/or conflicting evidence.   No action is needed for a variant of uncertain significance.   The genetics lab will notify the patient and the ordering provider when the variant is reclassified as either benign or pathogenic. Relatives only need to be tested for the variant if it is reclassified as pathogenic/likely pathogenic.   Approximately 90% of all VUS's are reclassified as benign. Therefore, no clinical decisions should be made based on a VUS, even if the variant seems to explain the cancers in the individual and/or family. In most cases, this is just a coincidence.     ASSESSMENT   Star Saeed, 44 y.o., assigned male sex at birth, has a personal/family history of:   Star: No cancers/tumors.   Mother: Breast cancer 35,  from metastatic recurrence at 43 (), no genetic testing  MGM: Breast cancer dx/ at 55    Star underwent comprehensive germline genetic testing that included 39 genes associated with hereditary breast and other cancers.     Testing was positive for a pathogenic mutation in CHEK2 that places him at increased risk for prostate cancer and possibly other cancers, such as thyroid  cancer. (See Counseling section for details.) NCCN recommends the following for men:   Prostate cancer: Consider annual screening (PSA with or without digital rectal exam) at age 40.  Thyroid cancer: Consider manual thyroid exam at the time of annual wellness exam.   There are no other screening recommendations as the association with other cancers is uncertain.     Star also has a variant in PALB2 that isn't fully understood at this time. It may or may not result in an increased risk for cancer.     PLAN   1. Encounter for nonprocreative genetic counseling   - No action is needed for the PALB2 variant of uncertain significance. [facility] will issue an amended report when the variant is reclassified.    - Average risk colon cancer screening. Star has no first-degree relatives (FDR) with colon cancer or advanced polyps, so Cologuard is an acceptable alternative to colonoscopy. Colonoscopy would be advised if any FDR develops colon cancer or advanced polyps.     2. CHEK2 gene mutation positive   - Annual prostate screening with PSA +/- digital rectal exam.    - Annual manual thyroid exam. US should be done if any nodules palpated.    - CHEK2 testing for at-risk relatives.    - Cancer risk-reduction strategies including exercise and a healthy diet that is high in fruits, vegetables and whole grains, low in red meats, and avoid processed foods.     3. Family history of breast cancer    4. Abnormal TSH   - Follow up with Dr. Melton (PCP).       Questions were encouraged and answered to the patient's satisfaction, and he verbalized understanding of information and agreement with the plan.       Documents sent to patient through the portal: Letter with details about the variants and recommendations; family letter to give to relatives.     COUNSELING   Germline CHEK2 Mutations     Background information   CHEK2 is a gene that, when functioning normally, helps protect against cancer. However, pathogenic mutations (harmful  changes) in the gene can prevent it from working properly, leading to a higher risk of certain types of cancer. Most cancers are caused by mutations that are acquired during a person's lifetime. A small percentage of cancers are caused by inherited (germline) mutations.      Cancer Risks   Cancer risk estimates are based on a specific type of mutation called a frameshift. Risks for other types of mutations (missense) are unclear but may be lower, particularly for breast cancer. An individuals risk may vary based on personal or family history of cancer as well as other personal risk factors.      Estimated lifetime risks:    Cancer Type  General Population  CHEK2+    Breast (Female) 12%  23-27%    Prostate  12%  Preliminary evidence suggests an increased risk of prostate cancer      Other cancers:   There may be an increased risk for thyroid, ovarian, kidney, stomach or male breast cancer. However, these risks are not well established.   Previous studies suggested an increased risk for colorectal cancer (5-10% compared to 5% in the general population). However, more recent studies and guideline updates indicate that there is no significantly increased risk. NCCN recommends general population screening guidelines for colorectal cancer.     Risks can vary depending on the specific variant an individual has:   Ume781Qct (c.470T) and Pdx596Fut (c.1283C>T) appear to have a lower risk for breast cancer (18-19%) compared to other CHEK2 variants.   Nvg554zp (c.1100del) appears to have a higher risk for breast cancer compared to other CHEK2 variants.  Individuals with biallelic CHEK2 variants, particularly c.1100del, appear to have a higher risk for breast cancer. Biallelic means the individual has two CHEK2 mutations, one on each copy of the gene. This generally occurs when someone inherits a CHEK2 mutation from both parents.      Management Recommendations   Breast (Female)   Referral to a breast specialist (high risk  breast clinic) for full risk assessment and management plan.   Breast awareness beginning at age 18. Individuals should be familiar with their breasts and promptly report changes to their health care provider. Periodic, consistent breast self-examination (BSE) may facilitate breast awareness.   Clinical breast exams every 6-12 months starting at age 25.   Yearly mammogram starting at age 40.*   Consider yearly breast MRI with and without contrast starting at age 30-35.*   Consider risk reduction agents, including discussion of risks and benefits.   There is insufficient evidence for risk reducing mastectomy (surgery to remove the breast tissue).    *Age may be modified based on family history (starting 5-10 years earlier than the youngest age of breast cancer diagnosis in the family).        Other cancers   There are no established screening guidelines.   Prostate cancer: Consider annual screening (PSA with or without digital rectal exam) at age 40.  Thyroid cancer: Consider manual thyroid exam at the time of annual wellness exam.      Risk to Relatives   Individuals typically have two copies of the CHEK2 gene - one copy from each biological parent. If a parent has a mutation in the CHEK2 gene, there is a 50% chance each child will inherit the mutation. It is likely that this mutation was inherited from one parent (although we cannot tell from testing which parent it was inherited from). As such, the siblings and children of someone with a CHEK2 mutation are each expected to have a 50% chance of having the same CHEK2 mutation. More distant relatives are also at risk of having the familial CHEK2 mutation.  Relatives of someone with a CHEK2 mutation should consider meeting with a genetic specialist to discuss testing for the familial mutation.       It is rare but possible that an individual has a new (de annika) mutation not inherited from either parent. If a mutation is de annika, the chance that siblings and more  distant relatives would have the familial mutation is much lower.       Reproductive Information   Pre-Implantation Genetic Testing   Some people who have a mutation in CHEK2 want to reduce the chance of passing on that mutation to a child. If an individual uses in-vitro fertilization to get pregnant, genetic testing can be conducted on embryos to determine if they have the familial mutation(s). Embryos that do not can then be selected for use, reducing the chance of having a child who is affected.  To get more information about this process, individuals with a CHEK2 mutation can speak with a reproductive genetic counselor.      SUBJECTIVE   Chief Complaint: Post-test genetic counseling  History of Present Illness (HPI):  Star Saeed was previously seen by me for a genetic evaluation and pre-test genetic counseling. He underwent genetic testing and is seen today to review the results and recommendations.   Medical, surgical, family history: Reviewed. No pertinent changes from previous visit.     Review of Systems  See HPI.      OBJECTIVE   Physical Exam  Limited secondary to the inherent nature of a virtual visit.  Very pleasant patient.  Constitutional: No apparent distress.   Neurological: Alert and oriented. No obvious neurological deficits.   Psychiatric: Normal mood, affect, thought content, speech, behavior, judgment.    REFERENCES   National Comprehensive Cancer Network (NCCN). (2024). Genetic/familial high-risk assessment: Breast, ovarian, and pancreatic. NCCN Clinical Practice Guidelines in Oncology (NCCN Guidelines), Version 3.2024.   National Comprehensive Cancer Network (NCCN). (2023). Genetic/familial high-risk assessment: Colorectal. NCCN Clinical Practice Guidelines in Oncology (NCCN Guidelines), Version 2.2023.   National Comprehensive Cancer Network (NCCN). (2024). Prostate Cancer Early Detection. NCCN Clinical Practice Guidelines in Oncology (NCCN Guidelines), Version 2.2024.     Mayelin  MANFRED Hagen, PA-C  Physician Assistant, Hereditary/High Risk Clinic  Hematology/Oncology, [facility]

## 2025-08-15 ENCOUNTER — OFFICE VISIT (OUTPATIENT)
Dept: FAMILY MEDICINE | Facility: CLINIC | Age: 45
End: 2025-08-15
Payer: COMMERCIAL

## 2025-08-15 VITALS
TEMPERATURE: 99 F | BODY MASS INDEX: 26.32 KG/M2 | WEIGHT: 167.69 LBS | HEART RATE: 71 BPM | HEIGHT: 67 IN | DIASTOLIC BLOOD PRESSURE: 88 MMHG | SYSTOLIC BLOOD PRESSURE: 132 MMHG | OXYGEN SATURATION: 99 %

## 2025-08-15 DIAGNOSIS — Z12.5 PROSTATE CANCER SCREENING: ICD-10-CM

## 2025-08-15 DIAGNOSIS — Z00.00 ANNUAL PHYSICAL EXAM: Primary | ICD-10-CM

## 2025-08-15 DIAGNOSIS — F43.9 STRESS: ICD-10-CM

## 2025-08-15 DIAGNOSIS — Z87.81 HISTORY OF FRACTURE OF LEG: ICD-10-CM

## 2025-08-15 DIAGNOSIS — Z30.09 VASECTOMY EVALUATION: ICD-10-CM

## 2025-08-15 DIAGNOSIS — Z86.39 HISTORY OF THYROID DISORDER: ICD-10-CM

## 2025-08-15 PROCEDURE — 99999 PR PBB SHADOW E&M-EST. PATIENT-LVL IV: CPT | Mod: PBBFAC,,,

## 2025-08-16 ENCOUNTER — LAB VISIT (OUTPATIENT)
Dept: LAB | Facility: HOSPITAL | Age: 45
End: 2025-08-16
Payer: COMMERCIAL

## 2025-08-16 DIAGNOSIS — Z12.5 PROSTATE CANCER SCREENING: ICD-10-CM

## 2025-08-16 DIAGNOSIS — Z00.00 ANNUAL PHYSICAL EXAM: ICD-10-CM

## 2025-08-16 DIAGNOSIS — Z86.39 HISTORY OF THYROID DISORDER: ICD-10-CM

## 2025-08-16 LAB
ABSOLUTE EOSINOPHIL (OHS): 0.1 K/UL
ABSOLUTE MONOCYTE (OHS): 0.63 K/UL (ref 0.3–1)
ABSOLUTE NEUTROPHIL COUNT (OHS): 3.71 K/UL (ref 1.8–7.7)
ALBUMIN SERPL BCP-MCNC: 4.6 G/DL (ref 3.5–5.2)
ALP SERPL-CCNC: 38 UNIT/L (ref 40–150)
ALT SERPL W/O P-5'-P-CCNC: 27 UNIT/L (ref 0–55)
ANION GAP (OHS): 8 MMOL/L (ref 8–16)
AST SERPL-CCNC: 25 UNIT/L (ref 0–50)
BASOPHILS # BLD AUTO: 0.03 K/UL
BASOPHILS NFR BLD AUTO: 0.4 %
BILIRUB SERPL-MCNC: 0.6 MG/DL (ref 0.1–1)
BUN SERPL-MCNC: 14 MG/DL (ref 6–20)
CALCIUM SERPL-MCNC: 9.2 MG/DL (ref 8.7–10.5)
CHLORIDE SERPL-SCNC: 107 MMOL/L (ref 95–110)
CHOLEST SERPL-MCNC: 184 MG/DL (ref 120–199)
CHOLEST/HDLC SERPL: 3.3 {RATIO} (ref 2–5)
CO2 SERPL-SCNC: 24 MMOL/L (ref 23–29)
CREAT SERPL-MCNC: 0.9 MG/DL (ref 0.5–1.4)
EAG (OHS): 97 MG/DL (ref 68–131)
ERYTHROCYTE [DISTWIDTH] IN BLOOD BY AUTOMATED COUNT: 12.9 % (ref 11.5–14.5)
GFR SERPLBLD CREATININE-BSD FMLA CKD-EPI: >60 ML/MIN/1.73/M2
GLUCOSE SERPL-MCNC: 94 MG/DL (ref 70–110)
HBA1C MFR BLD: 5 % (ref 4–5.6)
HCT VFR BLD AUTO: 42 % (ref 40–54)
HDLC SERPL-MCNC: 56 MG/DL (ref 40–75)
HDLC SERPL: 30.4 % (ref 20–50)
HGB BLD-MCNC: 13.9 GM/DL (ref 14–18)
IMM GRANULOCYTES # BLD AUTO: 0.05 K/UL (ref 0–0.04)
IMM GRANULOCYTES NFR BLD AUTO: 0.7 % (ref 0–0.5)
LDLC SERPL CALC-MCNC: 109.6 MG/DL (ref 63–159)
LYMPHOCYTES # BLD AUTO: 2.23 K/UL (ref 1–4.8)
MCH RBC QN AUTO: 30 PG (ref 27–31)
MCHC RBC AUTO-ENTMCNC: 33.1 G/DL (ref 32–36)
MCV RBC AUTO: 91 FL (ref 82–98)
NONHDLC SERPL-MCNC: 128 MG/DL
NUCLEATED RBC (/100WBC) (OHS): 0 /100 WBC
PLATELET # BLD AUTO: 212 K/UL (ref 150–450)
PMV BLD AUTO: 11.3 FL (ref 9.2–12.9)
POTASSIUM SERPL-SCNC: 5 MMOL/L (ref 3.5–5.1)
PROT SERPL-MCNC: 7.4 GM/DL (ref 6–8.4)
PSA SERPL-MCNC: 0.76 NG/ML
RBC # BLD AUTO: 4.64 M/UL (ref 4.6–6.2)
RELATIVE EOSINOPHIL (OHS): 1.5 %
RELATIVE LYMPHOCYTE (OHS): 33 % (ref 18–48)
RELATIVE MONOCYTE (OHS): 9.3 % (ref 4–15)
RELATIVE NEUTROPHIL (OHS): 55.1 % (ref 38–73)
SODIUM SERPL-SCNC: 139 MMOL/L (ref 136–145)
T4 FREE SERPL-MCNC: 0.98 NG/DL (ref 0.71–1.51)
TRIGL SERPL-MCNC: 92 MG/DL (ref 30–150)
TSH SERPL-ACNC: 4.15 UIU/ML (ref 0.4–4)
WBC # BLD AUTO: 6.75 K/UL (ref 3.9–12.7)

## 2025-08-16 PROCEDURE — 85025 COMPLETE CBC W/AUTO DIFF WBC: CPT

## 2025-08-16 PROCEDURE — 84439 ASSAY OF FREE THYROXINE: CPT

## 2025-08-16 PROCEDURE — 83036 HEMOGLOBIN GLYCOSYLATED A1C: CPT

## 2025-08-16 PROCEDURE — 84443 ASSAY THYROID STIM HORMONE: CPT

## 2025-08-16 PROCEDURE — 80053 COMPREHEN METABOLIC PANEL: CPT

## 2025-08-16 PROCEDURE — 84153 ASSAY OF PSA TOTAL: CPT

## 2025-08-16 PROCEDURE — 80061 LIPID PANEL: CPT

## 2025-08-16 PROCEDURE — 36415 COLL VENOUS BLD VENIPUNCTURE: CPT | Mod: PO

## 2025-08-22 ENCOUNTER — OFFICE VISIT (OUTPATIENT)
Dept: UROLOGY | Facility: CLINIC | Age: 45
End: 2025-08-22
Payer: COMMERCIAL

## 2025-08-22 DIAGNOSIS — Z80.3 FAMILY HISTORY OF BREAST CANCER: ICD-10-CM

## 2025-08-22 DIAGNOSIS — Z30.09 UNWANTED FERTILITY: Primary | ICD-10-CM

## 2025-08-22 PROCEDURE — 99999 PR PBB SHADOW E&M-EST. PATIENT-LVL III: CPT | Mod: PBBFAC,,, | Performed by: STUDENT IN AN ORGANIZED HEALTH CARE EDUCATION/TRAINING PROGRAM

## 2025-08-22 RX ORDER — DIAZEPAM 10 MG/1
10 TABLET ORAL
Qty: 1 TABLET | Refills: 0 | Status: SHIPPED | OUTPATIENT
Start: 2025-08-22 | End: 2025-08-23

## 2025-08-29 ENCOUNTER — PROCEDURE VISIT (OUTPATIENT)
Dept: UROLOGY | Facility: CLINIC | Age: 45
End: 2025-08-29
Payer: COMMERCIAL

## 2025-08-29 VITALS — WEIGHT: 169 LBS | BODY MASS INDEX: 26.47 KG/M2

## 2025-08-29 DIAGNOSIS — Z30.09 UNWANTED FERTILITY: Primary | ICD-10-CM
